# Patient Record
Sex: MALE | Race: WHITE | NOT HISPANIC OR LATINO | Employment: FULL TIME | URBAN - NONMETROPOLITAN AREA
[De-identification: names, ages, dates, MRNs, and addresses within clinical notes are randomized per-mention and may not be internally consistent; named-entity substitution may affect disease eponyms.]

---

## 2019-07-15 ENCOUNTER — OFFICE VISIT (OUTPATIENT)
Dept: URGENT CARE | Facility: PHYSICIAN GROUP | Age: 54
End: 2019-07-15
Payer: COMMERCIAL

## 2019-07-15 VITALS
OXYGEN SATURATION: 95 % | RESPIRATION RATE: 14 BRPM | HEART RATE: 76 BPM | HEIGHT: 69 IN | BODY MASS INDEX: 36.29 KG/M2 | SYSTOLIC BLOOD PRESSURE: 126 MMHG | WEIGHT: 245 LBS | DIASTOLIC BLOOD PRESSURE: 82 MMHG | TEMPERATURE: 97.6 F

## 2019-07-15 DIAGNOSIS — E66.9 OBESITY (BMI 35.0-39.9 WITHOUT COMORBIDITY): ICD-10-CM

## 2019-07-15 DIAGNOSIS — H10.11 ALLERGIC CONJUNCTIVITIS OF RIGHT EYE: ICD-10-CM

## 2019-07-15 PROCEDURE — 99204 OFFICE O/P NEW MOD 45 MIN: CPT | Performed by: PHYSICIAN ASSISTANT

## 2019-07-15 RX ORDER — METOPROLOL SUCCINATE 25 MG/1
25 TABLET, EXTENDED RELEASE ORAL DAILY
COMMUNITY

## 2019-07-15 RX ORDER — CROMOLYN SODIUM 40 MG/ML
2 SOLUTION/ DROPS OPHTHALMIC
Qty: 10 ML | Refills: 0 | Status: SHIPPED | OUTPATIENT
Start: 2019-07-15

## 2019-07-15 RX ORDER — LISINOPRIL AND HYDROCHLOROTHIAZIDE 12.5; 1 MG/1; MG/1
1 TABLET ORAL DAILY
COMMUNITY

## 2019-07-15 RX ORDER — ATORVASTATIN CALCIUM 10 MG/1
10 TABLET, FILM COATED ORAL NIGHTLY
COMMUNITY

## 2019-07-15 RX ORDER — CHLORAL HYDRATE 500 MG
1000 CAPSULE ORAL
COMMUNITY

## 2019-07-15 RX ORDER — GLIPIZIDE 5 MG/1
5 TABLET ORAL 2 TIMES DAILY
COMMUNITY

## 2019-07-15 NOTE — PROGRESS NOTES
Chief Complaint   Patient presents with   • Conjunctivitis       HISTORY OF PRESENT ILLNESS: Patient is a 53 y.o. male who presents today because he has a 2-day history of right eye redness and itching.  He has minimal drainage and no visual disturbances or changes.  He has not been taking any medications for symptoms.    Patient Active Problem List    Diagnosis Date Noted   • Obesity (BMI 35.0-39.9 without comorbidity) ([facility]) 07/15/2019       Allergies:Patient has no known allergies.    Current Outpatient Prescriptions Ordered in [facility]   Medication Sig Dispense Refill   • metformin (GLUCOPHAGE) 1000 MG tablet Take 1,000 mg by mouth 2 times a day, with meals.     • glipiZIDE (GLUCOTROL) 5 MG Tab Take 5 mg by mouth 2 times a day.     • metoprolol SR (TOPROL XL) 25 MG TABLET SR 24 HR Take 25 mg by mouth every day.     • atorvastatin (LIPITOR) 10 MG Tab Take 10 mg by mouth every evening.     • lisinopril-hydrochlorothiazide (PRINZIDE, ZESTORETIC) 10-12.5 MG per tablet Take 1 Tab by mouth every day.     • aspirin EC (ECOTRIN) 81 MG Tablet Delayed Response Take 81 mg by mouth every day.     • Omega-3 Fatty Acids (FISH OIL) 1000 MG Cap capsule Take 1,000 mg by mouth 3 times a day, with meals.     • cromolyn (OPTICROM) 4 % ophthalmic solution Place 2 Drops in right eye 5 Times a Day. 10 mL 0     No current Epic-ordered facility-administered medications on file.        No past medical history on file.    Social History   Substance Use Topics   • Smoking status: Never Smoker   • Smokeless tobacco: Never Used   • Alcohol use Not on file       No family status information on file.   No family history on file.    ROS:  Review of Systems   Constitutional: Negative for fever, chills, weight loss and malaise/fatigue.   HENT: Negative for ear pain, nosebleeds, congestion, sore throat and neck pain.    Eyes: Negative for blurred vision.  Positive for right eye complaints as in HPI  Respiratory: Negative for cough, sputum production,  "shortness of breath and wheezing.    Cardiovascular: Negative for chest pain, palpitations, orthopnea and leg swelling.   Gastrointestinal: Negative for heartburn, nausea, vomiting and abdominal pain.   Genitourinary: Negative for dysuria, urgency and frequency.     Exam:  /82 (BP Location: Right arm, Patient Position: Sitting, BP Cuff Size: Large adult)   Pulse 76   Temp 36.4 °C (97.6 °F) (Temporal)   Resp 14   Ht 1.753 m (5' 9\")   Wt 111.1 kg (245 lb)   SpO2 95%   General:  Well nourished, well developed male in NAD  Head:Normocephalic, atraumatic  Eyes: PERRLA, EOM within normal limits, mild to moderate right conjunctival injection, no scleral icterus, visual fields and acuity grossly intact.  Extremities: no clubbing, cyanosis, or edema.    Please note that this dictation was created using voice recognition software. I have made every reasonable attempt to correct obvious errors, but I expect that there are errors of grammar and possibly content that I did not discover before finalizing the note.    Assessment/Plan:  1. Allergic conjunctivitis of right eye  cromolyn (OPTICROM) 4 % ophthalmic solution   2. Obesity (BMI 35.0-39.9 without comorbidity)  Patient identified as having weight management issue.  Appropriate orders and counseling given.       Followup with primary care in the next 7-10 days if not significantly improving, return to the urgent care or go to the emergency room sooner for any worsening of symptoms.       "

## 2021-09-15 ENCOUNTER — OFFICE VISIT (OUTPATIENT)
Dept: ORTHOPEDIC SURGERY | Facility: CLINIC | Age: 56
End: 2021-09-15

## 2021-09-15 VITALS
SYSTOLIC BLOOD PRESSURE: 162 MMHG | HEIGHT: 69 IN | DIASTOLIC BLOOD PRESSURE: 98 MMHG | HEART RATE: 70 BPM | WEIGHT: 238 LBS | BODY MASS INDEX: 35.25 KG/M2

## 2021-09-15 DIAGNOSIS — M75.81 RIGHT ROTATOR CUFF TENDINITIS: ICD-10-CM

## 2021-09-15 DIAGNOSIS — M25.511 RIGHT SHOULDER PAIN, UNSPECIFIED CHRONICITY: Primary | ICD-10-CM

## 2021-09-15 PROCEDURE — 20610 DRAIN/INJ JOINT/BURSA W/O US: CPT | Performed by: PHYSICIAN ASSISTANT

## 2021-09-15 PROCEDURE — 99203 OFFICE O/P NEW LOW 30 MIN: CPT | Performed by: PHYSICIAN ASSISTANT

## 2021-09-15 RX ORDER — LIDOCAINE HYDROCHLORIDE 10 MG/ML
4 INJECTION, SOLUTION EPIDURAL; INFILTRATION; INTRACAUDAL; PERINEURAL
Status: COMPLETED | OUTPATIENT
Start: 2021-09-15 | End: 2021-09-15

## 2021-09-15 RX ORDER — TRIAMCINOLONE ACETONIDE 40 MG/ML
40 INJECTION, SUSPENSION INTRA-ARTICULAR; INTRAMUSCULAR
Status: COMPLETED | OUTPATIENT
Start: 2021-09-15 | End: 2021-09-15

## 2021-09-15 RX ORDER — CHLORAL HYDRATE 500 MG
1000 CAPSULE ORAL
COMMUNITY

## 2021-09-15 RX ORDER — ASPIRIN 81 MG/1
81 TABLET ORAL DAILY
COMMUNITY

## 2021-09-15 RX ORDER — GLIPIZIDE 2.5 MG/1
TABLET, EXTENDED RELEASE ORAL
COMMUNITY
Start: 2021-08-17

## 2021-09-15 RX ADMIN — LIDOCAINE HYDROCHLORIDE 4 ML: 10 INJECTION, SOLUTION EPIDURAL; INFILTRATION; INTRACAUDAL; PERINEURAL at 10:00

## 2021-09-15 RX ADMIN — TRIAMCINOLONE ACETONIDE 40 MG: 40 INJECTION, SUSPENSION INTRA-ARTICULAR; INTRAMUSCULAR at 10:00

## 2021-09-15 NOTE — PROGRESS NOTES
Procedure   Large Joint Arthrocentesis: R subacromial bursa  Date/Time: 9/15/2021 10:00 AM  Consent given by: patient  Site marked: site marked  Timeout: Immediately prior to procedure a time out was called to verify the correct patient, procedure, equipment, support staff and site/side marked as required   Supporting Documentation  Indications: pain   Procedure Details  Location: shoulder - R subacromial bursa  Preparation: Patient was prepped and draped in the usual sterile fashion  Needle size: 23 G  Approach: posterior  Medications administered: 4 mL lidocaine PF 1% 1 %; 40 mg triamcinolone acetonide 40 MG/ML  Patient tolerance: patient tolerated the procedure well with no immediate complications

## 2021-09-15 NOTE — PROGRESS NOTES
[facility] Note        Subjective     Pain of the Right Shoulder      HPI    Julian Rodriguez is a 55 y.o. male.  This is a very pleasant right-hand-dominant male presenting to discuss his right shoulder pain.  He reports is been bothering him for about 2 months.  No specific trauma or injury but has been moving and packing and using his arm a great deal.  He complains of aids anterior shoulder pain 8/10 with overhead activity and lifting.  It is stabbing and aching.  He does have some radiating pain.  Treated with Tylenol.  He is status post cuff repair in 2013.  Here for further evaluation treatment recommendations    Past Medical History:   Diagnosis Date   • Prostate cancer (CMS/HCC)    • Umbilical hernia       Past Surgical History:   Procedure Laterality Date   • BLADDER SLING MODIFIED, ANTERIOR AND POSTERIOR VAGINAL REPAIR     • PENILE PROSTHESIS IMPLANT     • PROSTATECTOMY     • ROTATOR CUFF REPAIR        No family history on file.  Social History     Socioeconomic History   • Marital status: Unknown     Spouse name: Not on file   • Number of children: Not on file   • Years of education: Not on file   • Highest education level: Not on file   Tobacco Use   • Smoking status: Never Smoker   • Smokeless tobacco: Never Used   Substance and Sexual Activity   • Alcohol use: Yes     Alcohol/week: 6.0 standard drinks     Types: 6 Glasses of wine per week   • Drug use: Never      Current Outpatient Medications on File Prior to Visit   Medication Sig Dispense Refill   • aspirin (aspirin) 81 MG EC tablet Take 81 mg by mouth Daily.     • atorvastatin (LIPITOR) 10 MG tablet Take 10 mg by mouth Daily.     • escitalopram (LEXAPRO) 20 MG tablet Take 20 mg by mouth Daily.     • glipizide (GLUCOTROL XL) 2.5 MG 24 hr tablet      • lisinopril-hydrochlorothiazide (PRINZIDE,ZESTORETIC) 10-12.5 MG per tablet Take 1 tablet by mouth Daily.     • metFORMIN (GLUCOPHAGE) 1000 MG tablet Take 1,000 mg by mouth 2  "(Two) Times a Day.     • Omega-3 Fatty Acids (fish oil) 1000 MG capsule capsule Take 1,000 mg by mouth.     • [DISCONTINUED] glipizide (GLUCOTROL) 5 MG tablet Take 5 mg by mouth 2 (Two) Times a Day.       No current facility-administered medications on file prior to visit.      No Known Allergies       Review of Systems   Constitutional: Negative.    HENT: Negative.    Eyes: Negative.    Respiratory: Negative.    Cardiovascular: Negative.    Gastrointestinal: Negative.    Endocrine: Negative.    Genitourinary: Negative.    Musculoskeletal: Positive for arthralgias.   Skin: Negative.    Allergic/Immunologic: Negative.    Neurological: Negative.    Hematological: Negative.    Psychiatric/Behavioral: Negative.         I reviewed the patient's chief complaint, history of present illness, review of systems, past medical history, surgical history, family history, social history, medications and allergy list.        Objective      Physical Exam  /98   Pulse 70   Ht 175.3 cm (69.02\")   Wt 108 kg (238 lb)   BMI 35.13 kg/m²     Body mass index is 35.13 kg/m².    General  Mental Status - alert  General Appearance - cooperative, well groomed, not in acute distress  Orientation - Oriented X3  Build & Nutrition - well developed and well nourished  Posture - normal posture  Gait -normal       Ortho Exam  Musculoskeletal   Upper Extremity   Right Shoulder     Inspection and Palpation:     Tenderness -tender over the supraspinatus no cervical or scapular tenderness    Crepitus - none    Sensation is normal    Examination reveals no ecchymosis.        Strength and Tone:    Supraspinatus -5/5    External Rotators-5/5    Infraspinatus - 5/5    Subscapularis -5/5    Deltoid - 5/5     Range of Motion   Left shoulder:    Internal Rotation: ROM - T7    External Rotation: AROM - 80 degrees    Elevation through flexion: AROM - 180 degrees    Right Shoulder:    Internal Rotation: ROM -T7    External Rotation: AROM -80 " degrees    Elevation through flexion: AROM -180 degrees         Impingement   Right shoulder    Hernandez-Bruce impingement test positive    Neer impingement test positive     Functional Testing   Right shoulder    AC crossover adduction test negative      Imaging/Studies  Imaging Results (Last 24 Hours)     Procedure Component Value Units Date/Time    XR Shoulder 2+ View Right [[id number]] Resulted: 09/15/21 0942     Updated: 09/15/21 0943    Narrative:      Right Shoulder Radiographs  Indication: right shoulder pain  Views: AP, outlet and axillary views of the right shoulder    Comparison: no prior studies available for review    Findings:  No acute bony abnormalities.  Small calcification seen just superior to   the glenoid on the AP view, with no other unusual bony features.              Assessment    Assessment:  1. Right shoulder pain, unspecified chronicity    2. Right rotator cuff tendinitis          Plan:  1. Recommend over-the-counter medication as needed for discomfort  2. Right rotator cuff tendinitis.  Reviewed today's x-rays clinical findings past and current treatment the patient.  We discussed further treatment including plus or minus subacromial steroid injection and formal physical therapy.  Patient would like to avoid formal PT at this time as he has a lot of travel of head of him to.  I given him a sheet of exercises to do at home.  I will see him back in 2 months or sooner if needed.    I discussed with the patient the potential benefits of performing a therapeutic injection of the right shoulder as well as potential risks including but not limited to infection, swelling, pain, bleeding, bruising, nerve/vessel damage, skin color changes, transient elevation in blood glucose levels, and fat atrophy. After informed consent and verifying correct patient, procedure site, and type of procedure, the area was prepped with Hibiclens, ethyl chloride was used to numb the skin. Via the posterior approach,  4cc of 1% lidocaine and  40mg/ml of Kenalog were injected into the right subacromial space. The patient tolerated the procedure well. There were no complications.           Gaby Salazar PA-C  09/15/21  14:18 EDT

## 2021-09-27 ENCOUNTER — TRANSCRIBE ORDERS (OUTPATIENT)
Dept: ADMINISTRATIVE | Facility: HOSPITAL | Age: 56
End: 2021-09-27

## 2021-09-27 DIAGNOSIS — C61 ADENOCARCINOMA OF PROSTATE (HCC): Primary | ICD-10-CM

## 2021-09-29 ENCOUNTER — HOSPITAL ENCOUNTER (OUTPATIENT)
Dept: NUCLEAR MEDICINE | Facility: HOSPITAL | Age: 56
Discharge: HOME | End: 2021-09-29

## 2021-09-29 ENCOUNTER — HOSPITAL ENCOUNTER (OUTPATIENT)
Dept: CT IMAGING | Facility: HOSPITAL | Age: 56
Discharge: HOME | End: 2021-09-29
Admitting: NURSE PRACTITIONER

## 2021-09-29 DIAGNOSIS — C61 ADENOCARCINOMA OF PROSTATE (HCC): ICD-10-CM

## 2021-09-29 PROCEDURE — 0 TECHNETIUM MEDRONATE KIT: Performed by: NURSE PRACTITIONER

## 2021-09-29 PROCEDURE — 82565 ASSAY OF CREATININE: CPT

## 2021-09-29 PROCEDURE — A9503 TC99M MEDRONATE: HCPCS | Performed by: NURSE PRACTITIONER

## 2021-09-29 PROCEDURE — 25010000002 IOPAMIDOL 61 % SOLUTION: Performed by: NURSE PRACTITIONER

## 2021-09-29 PROCEDURE — 78306 BONE IMAGING WHOLE BODY: CPT

## 2021-09-29 PROCEDURE — 74177 CT ABD & PELVIS W/CONTRAST: CPT

## 2021-09-29 RX ORDER — TC 99M MEDRONATE 20 MG/10ML
26.1 INJECTION, POWDER, LYOPHILIZED, FOR SOLUTION INTRAVENOUS
Status: COMPLETED | OUTPATIENT
Start: 2021-09-29 | End: 2021-09-29

## 2021-09-29 RX ADMIN — IOPAMIDOL 95 ML: 612 INJECTION, SOLUTION INTRAVENOUS at 11:57

## 2021-09-29 RX ADMIN — Medication 26.1 MILLICURIE: at 11:20

## 2021-10-04 LAB — CREAT BLDA-MCNC: 0.9 MG/DL (ref 0.6–1.3)

## 2021-10-13 ENCOUNTER — TELEPHONE (OUTPATIENT)
Dept: ORTHOPEDIC SURGERY | Facility: CLINIC | Age: 56
End: 2021-10-13

## 2021-10-13 NOTE — TELEPHONE ENCOUNTER
Left message for patient to contact me to reschedule his appointment on 11/24/21 since Gaby will be out.  Send patient directly to me or reschedule him for another day.

## 2021-11-30 ENCOUNTER — OFFICE VISIT (OUTPATIENT)
Dept: ORTHOPEDIC SURGERY | Facility: CLINIC | Age: 56
End: 2021-11-30

## 2021-11-30 VITALS — HEIGHT: 69 IN | WEIGHT: 238 LBS | BODY MASS INDEX: 35.25 KG/M2

## 2021-11-30 DIAGNOSIS — M75.81 RIGHT ROTATOR CUFF TENDINITIS: Primary | ICD-10-CM

## 2021-11-30 DIAGNOSIS — M25.511 RIGHT SHOULDER PAIN, UNSPECIFIED CHRONICITY: ICD-10-CM

## 2021-11-30 DIAGNOSIS — M75.21 BICEPS TENDONITIS ON RIGHT: ICD-10-CM

## 2021-11-30 PROCEDURE — 99213 OFFICE O/P EST LOW 20 MIN: CPT | Performed by: PHYSICIAN ASSISTANT

## 2021-11-30 PROCEDURE — 20610 DRAIN/INJ JOINT/BURSA W/O US: CPT | Performed by: PHYSICIAN ASSISTANT

## 2021-11-30 RX ADMIN — TRIAMCINOLONE ACETONIDE 40 MG: 40 INJECTION, SUSPENSION INTRA-ARTICULAR; INTRAMUSCULAR at 08:53

## 2021-11-30 RX ADMIN — LIDOCAINE HYDROCHLORIDE 1 ML: 10 INJECTION, SOLUTION INFILTRATION; PERINEURAL at 08:53

## 2021-12-01 RX ORDER — LIDOCAINE HYDROCHLORIDE 10 MG/ML
1 INJECTION, SOLUTION INFILTRATION; PERINEURAL
Status: COMPLETED | OUTPATIENT
Start: 2021-11-30 | End: 2021-11-30

## 2021-12-01 RX ORDER — TRIAMCINOLONE ACETONIDE 40 MG/ML
40 INJECTION, SUSPENSION INTRA-ARTICULAR; INTRAMUSCULAR
Status: COMPLETED | OUTPATIENT
Start: 2021-11-30 | End: 2021-11-30

## 2021-12-03 ENCOUNTER — TELEPHONE (OUTPATIENT)
Dept: ORTHOPEDIC SURGERY | Facility: CLINIC | Age: 56
End: 2021-12-03

## 2021-12-03 RX ORDER — MELOXICAM 15 MG/1
TABLET ORAL
Qty: 60 TABLET | Refills: 1 | Status: SHIPPED | OUTPATIENT
Start: 2021-12-03 | End: 2022-02-25

## 2021-12-03 NOTE — TELEPHONE ENCOUNTER
Gaby did not state a specific anti inflammatory in her note, so one was not pended.      Lucia Salazar

## 2021-12-03 NOTE — TELEPHONE ENCOUNTER
Patient called and states his pharmacy did not receive his anti inflammatory medication was send in for him on  Tuesday. Patient is going out of town tomorrow and needs the medications. Patient uses [facility] in [place]

## 2022-01-19 ENCOUNTER — TREATMENT (OUTPATIENT)
Dept: PHYSICAL THERAPY | Facility: CLINIC | Age: 57
End: 2022-01-19

## 2022-01-19 DIAGNOSIS — G89.29 CHRONIC RIGHT SHOULDER PAIN: Primary | ICD-10-CM

## 2022-01-19 DIAGNOSIS — M25.511 CHRONIC RIGHT SHOULDER PAIN: Primary | ICD-10-CM

## 2022-01-19 PROCEDURE — 97110 THERAPEUTIC EXERCISES: CPT | Performed by: PHYSICAL THERAPIST

## 2022-01-19 PROCEDURE — 97161 PT EVAL LOW COMPLEX 20 MIN: CPT | Performed by: PHYSICAL THERAPIST

## 2022-01-19 NOTE — PROGRESS NOTES
Physical Therapy Initial Evaluation and Plan of Care      Patient: Julian Rodriguez   : 1965  Diagnosis/ICD-10 Code:  The encounter diagnosis was Chronic right shoulder pain.   Referring practitioner: ANIL Henry*  Date of Initial Visit: Type: THERAPY  Noted: 2022  Today's Date: 2022  Patient seen for 1 sessions         Visit Diagnoses:    ICD-10-CM ICD-9-CM   1. Chronic right shoulder pain  M25.511 719.41    G89.29 338.29       Subjective Questionnaire: QuickDASH: 11.36    Subjective Evaluation    History of Present Illness  Onset date: 2021.  Mechanism of injury: Pt moved to new home last summer.  Noticed shoulder irritation after that.  He reports being very active around the house with building and organization projects.  He has stage 4 metastatic prostate cancer.  He is being treated through [facility].  He will be having radiation treatment in the near future.    He reports temporary relief after injection and after taking anti-inflammatories.  However, symptoms return as he resumes using the arm.  His PA gave him exercises to do, but he says they have not helped his symptoms.    Subjective comment: R shoulder pain  Patient Occupation: Retired Pain  Current pain ratin  At best pain ratin  At worst pain ratin  Location: R anterior shoulder  Quality: dull ache  Relieving factors: rest  Aggravating factors: overhead activity, repetitive movement and lifting  Progression: worsening    Hand dominance: right    Diagnostic Tests  Abnormal bone scan: normal shoulder but possible evidence of bony metastasis in ribs.    Treatments  Previous treatment: injection treatment and medication  Patient Goals  Patient goals for therapy: decreased pain           Treatment            Functional Testing  Functional Tests Options: Quick DASH   Objective          Postural Observations  Seated posture: poor  Standing posture: fair    Additional Postural Observation Details  Protracted B  scapulae and increased thoracic kyphosis.    Tenderness     Right Shoulder  Tenderness in the bicipital groove.     Cervical/Thoracic Screen   Thoracic range of motion within normal limits with the following exceptions: Pt is able to actively extend thoracic spine, but tends to rest in kyphotic posture.    Neurological Testing     Sensation     Shoulder   Left Shoulder   Intact: light touch    Right Shoulder   Intact: light touch    Active Range of Motion   Left Shoulder   Normal active range of motion    Right Shoulder   Normal active range of motion    Scapular Mobility     Additional Scapular Mobility Details  Decreased scapular retraction and depression.    Joint Play     Right Shoulder  Hypomobile in the inferior capsule and AC joint.     Strength/Myotome Testing     Left Shoulder   Normal muscle strength    Right Shoulder   Normal muscle strength    Tests     Right Shoulder   Negative Speed's.     Additional Tests Details  Normal strength.  Discomfort with resisted ER and with eccentric lowering from flexion.     General Comments     Shoulder Comments   Decreased pectoral flexibility.         Assessment & Plan     Assessment  Impairments: activity intolerance, lacks appropriate home exercise program and pain with function  Functional Limitations: lifting, uncomfortable because of pain, reaching overhead and unable to perform repetitive tasks  Assessment details: Pt continues with usual activities regardless of pain provocation.  Prognosis: fair  Prognosis details: Pt may not be receptive to modifying activity to allow healing of affected area.    Goals  Plan Goals: 4 weeks  Pt. demonstrates independence and compliance with initial HEP.  Pt. reports reduction in pain intensity to no worse than 3/10 on NPRS.  Pt demonstrates improving postural awareness to reduce anterior shoulder strain.    8 weeks  Pt. demonstrates independence in advanced HEP for ongoing improvement.  Pt notices improving tolerance for  activity with balance of rest, stretching, postural correction, and activity modification.  Functional outcome measure is improved to <10 points.          Plan  Therapy options: will be seen for skilled therapy services  Planned modality interventions: cryotherapy and iontophoresis  Planned therapy interventions: manual therapy, postural training, stretching, therapeutic activities, joint mobilization, home exercise program, functional ROM exercises, flexibility and body mechanics training  Frequency: 1x week  Duration in weeks: 8  Treatment plan discussed with: patient  Plan details: PT weekly for up to 8 weeks, addressing pain, posture, and functional limitations related to shoulder symptoms.        Timed:  Manual Therapy:         mins  50610;  Therapeutic Exercise:    10     mins  01579;     Neuromuscular Amanda:        mins  90094;    Therapeutic Activity:          mins  47899;     Gait Training:           mins  68403;     Ultrasound:          mins  01854;    Electrical Stimulation:         mins  11797 ( );  Iontophoresis  ___ mins   12528    Untimed:  Electrical Stimulation:         mins  57253 ( );  Mechanical Traction:         mins  68936;     Timed Treatment:   10   mins   Total Treatment:     40   mins    PT SIGNATURE: Isis Valadez PT   Electronically signed  DATE TREATMENT INITIATED: 1/19/2022    Initial Certification  Certification Period: 1/19/2022thru4/18/2022  I certify that the therapy services are furnished while this patient is under my care.  The services outlined above are required by this patient, and will be reviewed every 90 days.    Physician Signature:_____________________________________________     PHYSICIAN: Gaby Salazar PA-C      DATE:     Please sign and return via fax to [phone].. Thank you, [facility].

## 2022-02-10 ENCOUNTER — TREATMENT (OUTPATIENT)
Dept: PHYSICAL THERAPY | Facility: CLINIC | Age: 57
End: 2022-02-10

## 2022-02-10 DIAGNOSIS — G89.29 CHRONIC RIGHT SHOULDER PAIN: Primary | ICD-10-CM

## 2022-02-10 DIAGNOSIS — M25.511 CHRONIC RIGHT SHOULDER PAIN: Primary | ICD-10-CM

## 2022-02-10 PROCEDURE — 97110 THERAPEUTIC EXERCISES: CPT | Performed by: PHYSICAL THERAPIST

## 2022-02-10 NOTE — PROGRESS NOTES
Physical Therapy Daily Progress Note  VISIT: 2          Subjective    Julian Keeping reports: intermittent R shoulder pain with activities such as lifting groceries.  He demonstrates a motion with outstretched arm, as if reaching over the car seat.  He says none of the exercises hurt as he does them, but overall, they seem to make his shoulder feel worse.  He is not interested in modifying the exercises.  He has just stopped doing them.        Pre-treatment pain:  0  Post-treatment pain:  0      Objective    Treatment    Exercise 1  Exercise Name 1: Pt education in lifting mechanics to reduce strain on R shoulder                 Assessment & Plan     Assessment    Assessment details: Pt does not think he wants to continue with PT.  He will consult with ortho PA and will cancel his last PT session if she is in agreement.  He is dealing with stage 4 cancer and wishes to emphasize quality of life rather than going to more medical appointments.      Plan  Plan details: Pt may cancel last PT follow up after consulting with ortho PA.                 Timed:  Manual Therapy:         mins  50641;  Therapeutic Exercise:    15     mins  01432;     Neuromuscular Amanda:        mins  70897;    Therapeutic Activity:          mins  19146;     Gait Training:           mins  22988;     Ultrasound:          mins  55937;    Electrical Stimulation:         mins  77629 ( );    Untimed:  Electrical Stimulation:         mins  01963 ( );  Mechanical Traction:         mins  50422;     Timed Treatment:   15   mins   Total Treatment:     15   mins      Isis Valadez PT  Physical Therapist

## 2022-02-25 ENCOUNTER — OFFICE VISIT (OUTPATIENT)
Dept: ORTHOPEDIC SURGERY | Facility: CLINIC | Age: 57
End: 2022-02-25

## 2022-02-25 VITALS
DIASTOLIC BLOOD PRESSURE: 86 MMHG | WEIGHT: 235.4 LBS | HEIGHT: 69 IN | SYSTOLIC BLOOD PRESSURE: 138 MMHG | BODY MASS INDEX: 34.87 KG/M2

## 2022-02-25 DIAGNOSIS — M25.511 RIGHT SHOULDER PAIN, UNSPECIFIED CHRONICITY: ICD-10-CM

## 2022-02-25 DIAGNOSIS — M75.81 RIGHT ROTATOR CUFF TENDINITIS: ICD-10-CM

## 2022-02-25 DIAGNOSIS — M75.21 BICEPS TENDONITIS ON RIGHT: Primary | ICD-10-CM

## 2022-02-25 PROCEDURE — 99213 OFFICE O/P EST LOW 20 MIN: CPT | Performed by: PHYSICIAN ASSISTANT

## 2022-02-25 PROCEDURE — 20550 NJX 1 TENDON SHEATH/LIGAMENT: CPT | Performed by: PHYSICIAN ASSISTANT

## 2022-02-25 RX ORDER — MELOXICAM 15 MG/1
TABLET ORAL
Qty: 60 TABLET | Refills: 1 | Status: SHIPPED | OUTPATIENT
Start: 2022-02-25 | End: 2022-12-19 | Stop reason: HOSPADM

## 2022-02-25 RX ADMIN — TRIAMCINOLONE ACETONIDE 40 MG: 40 INJECTION, SUSPENSION INTRA-ARTICULAR; INTRAMUSCULAR at 09:05

## 2022-02-25 RX ADMIN — LIDOCAINE HYDROCHLORIDE 5 ML: 10 INJECTION, SOLUTION EPIDURAL; INFILTRATION; INTRACAUDAL; PERINEURAL at 09:05

## 2022-03-01 RX ORDER — LIDOCAINE HYDROCHLORIDE 10 MG/ML
5 INJECTION, SOLUTION EPIDURAL; INFILTRATION; INTRACAUDAL; PERINEURAL
Status: COMPLETED | OUTPATIENT
Start: 2022-02-25 | End: 2022-02-25

## 2022-03-01 RX ORDER — TRIAMCINOLONE ACETONIDE 40 MG/ML
40 INJECTION, SUSPENSION INTRA-ARTICULAR; INTRAMUSCULAR
Status: COMPLETED | OUTPATIENT
Start: 2022-02-25 | End: 2022-02-25

## 2022-03-28 ENCOUNTER — DOCUMENTATION (OUTPATIENT)
Dept: PHYSICAL THERAPY | Facility: CLINIC | Age: 57
End: 2022-03-28

## 2022-03-28 NOTE — PROGRESS NOTES
Discharge Summary  Discharge Summary from Physical Therapy Report      Patient: Julian Rodriguez   : 1965  Diagnosis/ICD-10 Code:  There were no encounter diagnoses.   Referring practitioner: No ref. provider found  Date of Initial Visit: No linked episodes  Today's Date: 3/28/2022  Date of Last Visit: 2-     Number of Visits: 2    Discharge Status of Patient: cancelled last 3 scheduled appointments    Goals: unknown    Discharge Plan: Continue with current home exercise program as instructed      Date of Discharge: 3/28/2022        Isis Valadez, PT

## 2022-04-07 ENCOUNTER — HOSPITAL ENCOUNTER (OUTPATIENT)
Dept: MRI IMAGING | Facility: HOSPITAL | Age: 57
Discharge: HOME | End: 2022-04-07

## 2022-04-07 DIAGNOSIS — M25.511 RIGHT SHOULDER PAIN, UNSPECIFIED CHRONICITY: ICD-10-CM

## 2022-04-07 DIAGNOSIS — M75.21 BICEPS TENDONITIS ON RIGHT: ICD-10-CM

## 2022-04-07 DIAGNOSIS — M75.81 RIGHT ROTATOR CUFF TENDINITIS: ICD-10-CM

## 2022-12-17 ENCOUNTER — HOSPITAL ENCOUNTER (OUTPATIENT)
Facility: HOSPITAL | Age: 57
Discharge: HOME | End: 2022-12-19
Attending: EMERGENCY MEDICINE | Admitting: INTERNAL MEDICINE

## 2022-12-17 ENCOUNTER — APPOINTMENT (OUTPATIENT)
Dept: GENERAL RADIOLOGY | Facility: HOSPITAL | Age: 57
End: 2022-12-17

## 2022-12-17 DIAGNOSIS — Z85.46 HISTORY OF PROSTATE CANCER: ICD-10-CM

## 2022-12-17 DIAGNOSIS — J10.1 INFLUENZA A: Primary | ICD-10-CM

## 2022-12-17 DIAGNOSIS — R11.10 INTRACTABLE VOMITING: ICD-10-CM

## 2022-12-17 DIAGNOSIS — E83.42 HYPOMAGNESEMIA: ICD-10-CM

## 2022-12-17 DIAGNOSIS — R11.14 BILIOUS VOMITING WITH NAUSEA: ICD-10-CM

## 2022-12-17 PROBLEM — T45.1X5A ANEMIA DUE TO CHEMOTHERAPY: Status: ACTIVE | Noted: 2022-12-17

## 2022-12-17 PROBLEM — R11.2 NAUSEA AND VOMITING: Status: ACTIVE | Noted: 2022-12-17

## 2022-12-17 PROBLEM — D64.81 ANEMIA DUE TO CHEMOTHERAPY: Status: ACTIVE | Noted: 2022-12-17

## 2022-12-17 PROBLEM — D64.9 CHRONIC ANEMIA: Status: ACTIVE | Noted: 2022-12-17

## 2022-12-17 LAB
ALBUMIN SERPL-MCNC: 3.8 G/DL (ref 3.5–5.2)
ALBUMIN/GLOB SERPL: 1 G/DL
ALP SERPL-CCNC: 106 U/L (ref 39–117)
ALT SERPL W P-5'-P-CCNC: 36 U/L (ref 1–41)
AMYLASE SERPL-CCNC: 48 U/L (ref 28–100)
ANION GAP SERPL CALCULATED.3IONS-SCNC: 8 MMOL/L (ref 5–15)
AST SERPL-CCNC: 32 U/L (ref 1–40)
BACTERIA UR QL AUTO: NORMAL /HPF
BASOPHILS # BLD AUTO: 0.03 10*3/MM3 (ref 0–0.2)
BASOPHILS NFR BLD AUTO: 0.5 % (ref 0–1.5)
BILIRUB SERPL-MCNC: 0.6 MG/DL (ref 0–1.2)
BILIRUB UR QL STRIP: NEGATIVE
BUN SERPL-MCNC: 9 MG/DL (ref 6–20)
BUN/CREAT SERPL: 10.6 (ref 7–25)
CALCIUM SPEC-SCNC: 9.3 MG/DL (ref 8.6–10.5)
CHLORIDE SERPL-SCNC: 99 MMOL/L (ref 98–107)
CLARITY UR: ABNORMAL
CO2 SERPL-SCNC: 26 MMOL/L (ref 22–29)
COLOR UR: YELLOW
CREAT SERPL-MCNC: 0.85 MG/DL (ref 0.76–1.27)
D-LACTATE SERPL-SCNC: 0.8 MMOL/L (ref 0.5–2)
DEPRECATED RDW RBC AUTO: 44.8 FL (ref 37–54)
EGFRCR SERPLBLD CKD-EPI 2021: 100.1 ML/MIN/1.73
EOSINOPHIL # BLD AUTO: 0 10*3/MM3 (ref 0–0.4)
EOSINOPHIL NFR BLD AUTO: 0 % (ref 0.3–6.2)
ERYTHROCYTE [DISTWIDTH] IN BLOOD BY AUTOMATED COUNT: 12.7 % (ref 12.3–15.4)
FLUAV RNA RESP QL NAA+PROBE: DETECTED
FLUBV RNA RESP QL NAA+PROBE: NOT DETECTED
GLOBULIN UR ELPH-MCNC: 4 GM/DL
GLUCOSE BLDC GLUCOMTR-MCNC: 114 MG/DL (ref 70–130)
GLUCOSE SERPL-MCNC: 146 MG/DL (ref 65–99)
GLUCOSE UR STRIP-MCNC: NEGATIVE MG/DL
HBA1C MFR BLD: 6.1 % (ref 4.8–5.6)
HCT VFR BLD AUTO: 30.5 % (ref 37.5–51)
HGB BLD-MCNC: 10.4 G/DL (ref 13–17.7)
HGB UR QL STRIP.AUTO: NEGATIVE
HOLD SPECIMEN: NORMAL
HYALINE CASTS UR QL AUTO: NORMAL /LPF
IMM GRANULOCYTES # BLD AUTO: 0.01 10*3/MM3 (ref 0–0.05)
IMM GRANULOCYTES NFR BLD AUTO: 0.2 % (ref 0–0.5)
KETONES UR QL STRIP: NEGATIVE
LEUKOCYTE ESTERASE UR QL STRIP.AUTO: NEGATIVE
LIPASE SERPL-CCNC: 36 U/L (ref 13–60)
LYMPHOCYTES # BLD AUTO: 1.61 10*3/MM3 (ref 0.7–3.1)
LYMPHOCYTES NFR BLD AUTO: 27.3 % (ref 19.6–45.3)
MAGNESIUM SERPL-MCNC: 1.4 MG/DL (ref 1.6–2.6)
MCH RBC QN AUTO: 32.8 PG (ref 26.6–33)
MCHC RBC AUTO-ENTMCNC: 34.1 G/DL (ref 31.5–35.7)
MCV RBC AUTO: 96.2 FL (ref 79–97)
MONOCYTES # BLD AUTO: 0.65 10*3/MM3 (ref 0.1–0.9)
MONOCYTES NFR BLD AUTO: 11 % (ref 5–12)
NEUTROPHILS NFR BLD AUTO: 3.6 10*3/MM3 (ref 1.7–7)
NEUTROPHILS NFR BLD AUTO: 61 % (ref 42.7–76)
NITRITE UR QL STRIP: NEGATIVE
NRBC BLD AUTO-RTO: 0 /100 WBC (ref 0–0.2)
PH UR STRIP.AUTO: 7.5 [PH] (ref 5–8)
PLATELET # BLD AUTO: 150 10*3/MM3 (ref 140–450)
PMV BLD AUTO: 9.8 FL (ref 6–12)
POTASSIUM SERPL-SCNC: 4 MMOL/L (ref 3.5–5.2)
PROT SERPL-MCNC: 7.8 G/DL (ref 6–8.5)
PROT UR QL STRIP: ABNORMAL
RBC # BLD AUTO: 3.17 10*6/MM3 (ref 4.14–5.8)
RBC # UR STRIP: NORMAL /HPF
REF LAB TEST METHOD: NORMAL
SARS-COV-2 RNA RESP QL NAA+PROBE: NOT DETECTED
SODIUM SERPL-SCNC: 133 MMOL/L (ref 136–145)
SP GR UR STRIP: 1.01 (ref 1–1.03)
SQUAMOUS #/AREA URNS HPF: NORMAL /HPF
TROPONIN T SERPL-MCNC: <0.01 NG/ML (ref 0–0.03)
UROBILINOGEN UR QL STRIP: ABNORMAL
WBC # UR STRIP: NORMAL /HPF
WBC NRBC COR # BLD: 5.9 10*3/MM3 (ref 3.4–10.8)
WHOLE BLOOD HOLD COAG: NORMAL
WHOLE BLOOD HOLD SPECIMEN: NORMAL

## 2022-12-17 PROCEDURE — 87636 SARSCOV2 & INF A&B AMP PRB: CPT | Performed by: EMERGENCY MEDICINE

## 2022-12-17 PROCEDURE — G0378 HOSPITAL OBSERVATION PER HR: HCPCS

## 2022-12-17 PROCEDURE — 25010000002 MAGNESIUM SULFATE 2 GM/50ML SOLUTION: Performed by: NURSE PRACTITIONER

## 2022-12-17 PROCEDURE — 85025 COMPLETE CBC W/AUTO DIFF WBC: CPT | Performed by: EMERGENCY MEDICINE

## 2022-12-17 PROCEDURE — 82962 GLUCOSE BLOOD TEST: CPT

## 2022-12-17 PROCEDURE — 83036 HEMOGLOBIN GLYCOSYLATED A1C: CPT | Performed by: HOSPITALIST

## 2022-12-17 PROCEDURE — 96366 THER/PROPH/DIAG IV INF ADDON: CPT

## 2022-12-17 PROCEDURE — 96365 THER/PROPH/DIAG IV INF INIT: CPT

## 2022-12-17 PROCEDURE — 83690 ASSAY OF LIPASE: CPT | Performed by: NURSE PRACTITIONER

## 2022-12-17 PROCEDURE — 82150 ASSAY OF AMYLASE: CPT | Performed by: NURSE PRACTITIONER

## 2022-12-17 PROCEDURE — 81001 URINALYSIS AUTO W/SCOPE: CPT | Performed by: EMERGENCY MEDICINE

## 2022-12-17 PROCEDURE — C9803 HOPD COVID-19 SPEC COLLECT: HCPCS

## 2022-12-17 PROCEDURE — 83735 ASSAY OF MAGNESIUM: CPT | Performed by: EMERGENCY MEDICINE

## 2022-12-17 PROCEDURE — 93005 ELECTROCARDIOGRAM TRACING: CPT | Performed by: EMERGENCY MEDICINE

## 2022-12-17 PROCEDURE — 83605 ASSAY OF LACTIC ACID: CPT | Performed by: HOSPITALIST

## 2022-12-17 PROCEDURE — 84484 ASSAY OF TROPONIN QUANT: CPT | Performed by: EMERGENCY MEDICINE

## 2022-12-17 PROCEDURE — 71045 X-RAY EXAM CHEST 1 VIEW: CPT

## 2022-12-17 PROCEDURE — 99284 EMERGENCY DEPT VISIT MOD MDM: CPT

## 2022-12-17 PROCEDURE — 36415 COLL VENOUS BLD VENIPUNCTURE: CPT

## 2022-12-17 PROCEDURE — 99220 PR INITIAL OBSERVATION CARE/DAY 70 MINUTES: CPT | Performed by: NURSE PRACTITIONER

## 2022-12-17 PROCEDURE — 80053 COMPREHEN METABOLIC PANEL: CPT | Performed by: EMERGENCY MEDICINE

## 2022-12-17 PROCEDURE — 25010000002 ENOXAPARIN PER 10 MG: Performed by: NURSE PRACTITIONER

## 2022-12-17 PROCEDURE — 96361 HYDRATE IV INFUSION ADD-ON: CPT

## 2022-12-17 RX ORDER — FLUTICASONE PROPIONATE 50 MCG
2 SPRAY, SUSPENSION (ML) NASAL DAILY
Status: DISCONTINUED | OUTPATIENT
Start: 2022-12-17 | End: 2022-12-19 | Stop reason: HOSPADM

## 2022-12-17 RX ORDER — NICOTINE POLACRILEX 4 MG
15 LOZENGE BUCCAL
Status: DISCONTINUED | OUTPATIENT
Start: 2022-12-17 | End: 2022-12-19 | Stop reason: HOSPADM

## 2022-12-17 RX ORDER — ATORVASTATIN CALCIUM 10 MG/1
10 TABLET, FILM COATED ORAL DAILY
Status: DISCONTINUED | OUTPATIENT
Start: 2022-12-18 | End: 2022-12-19 | Stop reason: HOSPADM

## 2022-12-17 RX ORDER — ACETAMINOPHEN 325 MG/1
650 TABLET ORAL EVERY 6 HOURS PRN
Status: DISCONTINUED | OUTPATIENT
Start: 2022-12-17 | End: 2022-12-19 | Stop reason: HOSPADM

## 2022-12-17 RX ORDER — SODIUM CHLORIDE 0.9 % (FLUSH) 0.9 %
10 SYRINGE (ML) INJECTION AS NEEDED
Status: DISCONTINUED | OUTPATIENT
Start: 2022-12-17 | End: 2022-12-19 | Stop reason: HOSPADM

## 2022-12-17 RX ORDER — POTASSIUM CHLORIDE 1.5 G/1.77G
40 POWDER, FOR SOLUTION ORAL AS NEEDED
Status: DISCONTINUED | OUTPATIENT
Start: 2022-12-17 | End: 2022-12-19 | Stop reason: HOSPADM

## 2022-12-17 RX ORDER — SODIUM CHLORIDE 9 MG/ML
40 INJECTION, SOLUTION INTRAVENOUS AS NEEDED
Status: DISCONTINUED | OUTPATIENT
Start: 2022-12-17 | End: 2022-12-19 | Stop reason: HOSPADM

## 2022-12-17 RX ORDER — MAGNESIUM SULFATE HEPTAHYDRATE 40 MG/ML
2 INJECTION, SOLUTION INTRAVENOUS AS NEEDED
Status: DISCONTINUED | OUTPATIENT
Start: 2022-12-17 | End: 2022-12-19 | Stop reason: HOSPADM

## 2022-12-17 RX ORDER — MAGNESIUM SULFATE HEPTAHYDRATE 40 MG/ML
4 INJECTION, SOLUTION INTRAVENOUS AS NEEDED
Status: DISCONTINUED | OUTPATIENT
Start: 2022-12-17 | End: 2022-12-19 | Stop reason: HOSPADM

## 2022-12-17 RX ORDER — POTASSIUM CHLORIDE 7.45 MG/ML
10 INJECTION INTRAVENOUS
Status: DISCONTINUED | OUTPATIENT
Start: 2022-12-17 | End: 2022-12-19 | Stop reason: HOSPADM

## 2022-12-17 RX ORDER — ONDANSETRON 2 MG/ML
4 INJECTION INTRAMUSCULAR; INTRAVENOUS EVERY 6 HOURS PRN
Status: DISCONTINUED | OUTPATIENT
Start: 2022-12-17 | End: 2022-12-19 | Stop reason: HOSPADM

## 2022-12-17 RX ORDER — ONDANSETRON 4 MG/1
4 TABLET, FILM COATED ORAL EVERY 6 HOURS PRN
Status: DISCONTINUED | OUTPATIENT
Start: 2022-12-17 | End: 2022-12-19 | Stop reason: HOSPADM

## 2022-12-17 RX ORDER — ESCITALOPRAM OXALATE 10 MG/1
20 TABLET ORAL DAILY
Status: DISCONTINUED | OUTPATIENT
Start: 2022-12-18 | End: 2022-12-19 | Stop reason: HOSPADM

## 2022-12-17 RX ORDER — SODIUM CHLORIDE 0.9 % (FLUSH) 0.9 %
10 SYRINGE (ML) INJECTION EVERY 12 HOURS SCHEDULED
Status: DISCONTINUED | OUTPATIENT
Start: 2022-12-17 | End: 2022-12-19 | Stop reason: HOSPADM

## 2022-12-17 RX ORDER — SODIUM CHLORIDE, SODIUM LACTATE, POTASSIUM CHLORIDE, CALCIUM CHLORIDE 600; 310; 30; 20 MG/100ML; MG/100ML; MG/100ML; MG/100ML
100 INJECTION, SOLUTION INTRAVENOUS CONTINUOUS
Status: ACTIVE | OUTPATIENT
Start: 2022-12-17 | End: 2022-12-18

## 2022-12-17 RX ORDER — DEXTROSE MONOHYDRATE 25 G/50ML
25 INJECTION, SOLUTION INTRAVENOUS
Status: DISCONTINUED | OUTPATIENT
Start: 2022-12-17 | End: 2022-12-19 | Stop reason: HOSPADM

## 2022-12-17 RX ORDER — INSULIN LISPRO 100 [IU]/ML
0-7 INJECTION, SOLUTION INTRAVENOUS; SUBCUTANEOUS
Status: DISCONTINUED | OUTPATIENT
Start: 2022-12-17 | End: 2022-12-19 | Stop reason: HOSPADM

## 2022-12-17 RX ORDER — CALCIUM CARBONATE 200(500)MG
2 TABLET,CHEWABLE ORAL 2 TIMES DAILY PRN
Status: DISCONTINUED | OUTPATIENT
Start: 2022-12-17 | End: 2022-12-19 | Stop reason: HOSPADM

## 2022-12-17 RX ORDER — POTASSIUM CHLORIDE 750 MG/1
40 CAPSULE, EXTENDED RELEASE ORAL AS NEEDED
Status: DISCONTINUED | OUTPATIENT
Start: 2022-12-17 | End: 2022-12-19 | Stop reason: HOSPADM

## 2022-12-17 RX ORDER — PANTOPRAZOLE SODIUM 40 MG/1
40 TABLET, DELAYED RELEASE ORAL
Status: DISCONTINUED | OUTPATIENT
Start: 2022-12-18 | End: 2022-12-19 | Stop reason: HOSPADM

## 2022-12-17 RX ORDER — ENOXAPARIN SODIUM 100 MG/ML
40 INJECTION SUBCUTANEOUS DAILY
Status: DISCONTINUED | OUTPATIENT
Start: 2022-12-17 | End: 2022-12-19 | Stop reason: HOSPADM

## 2022-12-17 RX ADMIN — MAGNESIUM SULFATE HEPTAHYDRATE 2 G: 2 INJECTION, SOLUTION INTRAVENOUS at 20:27

## 2022-12-17 RX ADMIN — MAGNESIUM SULFATE HEPTAHYDRATE 2 G: 2 INJECTION, SOLUTION INTRAVENOUS at 22:54

## 2022-12-17 RX ADMIN — MAGNESIUM SULFATE HEPTAHYDRATE 2 G: 2 INJECTION, SOLUTION INTRAVENOUS at 17:53

## 2022-12-17 RX ADMIN — SODIUM CHLORIDE 1000 ML: 9 INJECTION, SOLUTION INTRAVENOUS at 15:16

## 2022-12-17 RX ADMIN — FLUTICASONE PROPIONATE 2 SPRAY: 50 SPRAY, METERED NASAL at 18:27

## 2022-12-17 RX ADMIN — SODIUM CHLORIDE, POTASSIUM CHLORIDE, SODIUM LACTATE AND CALCIUM CHLORIDE 100 ML/HR: 600; 310; 30; 20 INJECTION, SOLUTION INTRAVENOUS at 17:53

## 2022-12-17 RX ADMIN — RELUGOLIX 120 MG: 120 TABLET, FILM COATED ORAL at 23:15

## 2022-12-17 RX ADMIN — PANCRELIPASE 36000 UNITS OF LIPASE: 36000; 180000; 114000 CAPSULE, DELAYED RELEASE PELLETS ORAL at 18:27

## 2022-12-17 NOTE — H&P
[facility] Medicine Services  HISTORY AND PHYSICAL    Patient Name: Julian Rodriguez  : 1965  MRN: [id number]  Primary Care Physician: Aaliyah Dawson MD  Date of admission: 2022    Subjective   Subjective     Chief Complaint:  Nausea, vomiting, fever, chills, weakness    HPI:  Julian Rodriguez is a 57 y.o. male with PMH significant for prostate cancer (diagnosis and radical prostatectomy , metastases to retroperitoneum and lymph nodes 2022, follows at [facility]), gastroparesis, duodenitis, HTN, HLD, DM2 who presents to [facility] ED via EMS for severe weakness, fevers/chills, and inability to keep any food down.  Patient originally started feeling poorly on Thursday with fever to 102.  His grandson had known flu, so patient went to his PCP on Friday and started Tamiflu.  He was able to take 2 doses but has not had any today as he has not been able to keep any food or fluids down.  He has not had any of his home medications in 5 days other than his chemo medication, as well as aspirin and Tylenol for fever.      Patient has been followed with GI at [facility] for ongoing nausea and vomiting since 2022 when he started a new chemo drug for prostate cancer. He has had a 50-lb weight loss since 2022.  His oncologist does not think the N/V is related to the medication and referred him for GI evaluation. Patient is supposed to have an MRI of his pancreas and is waiting on scheduling. GI also changed him from Reglan to Creon, but he has not started the Creon yet because of unavailability at the pharmacy.  He is asking if we can start the Creon here.  Patient states he last ate about half a sandwich 2 days ago.  He is feeling little better since getting IV fluids here and is hoping he can eat something soon.    Review of Systems   Constitutional: Positive for appetite change, chills, diaphoresis, fatigue and fever.   HENT: Positive for congestion and postnasal drip.  Negative for sinus pressure, sore throat and trouble swallowing.    Respiratory: Negative for cough, chest tightness and shortness of breath.    Cardiovascular: Negative for chest pain, palpitations and leg swelling.   Gastrointestinal: Positive for nausea and vomiting. Negative for abdominal pain, blood in stool and diarrhea.   Genitourinary: Negative for difficulty urinating and dysuria.   Musculoskeletal: Positive for arthralgias and myalgias.   Skin: Negative for rash and wound.   Neurological: Positive for dizziness, weakness and light-headedness. Negative for syncope and headaches.   Psychiatric/Behavioral: Positive for sleep disturbance. The patient is not nervous/anxious.         All other systems reviewed and are negative.     Personal History     Past Medical History:   Diagnosis Date   • Diabetes mellitus (HCC)    • Hypertension    • Prostate cancer (HCC)    • Umbilical hernia              Past Surgical History:   Procedure Laterality Date   • BLADDER SLING MODIFIED, ANTERIOR AND POSTERIOR VAGINAL REPAIR     • PENILE PROSTHESIS IMPLANT     • PROSTATECTOMY     • ROTATOR CUFF REPAIR         Family History: family history includes Cancer in his father, maternal grandmother, mother, and paternal grandfather. Otherwise pertinent FHx was reviewed and unremarkable.     Social History:  reports that he has never smoked. He has never used smokeless tobacco. He reports current alcohol use of about 6.0 standard drinks per week. He reports that he does not use drugs.  Social History     Social History Narrative   • Not on file       Medications:  aspirin, atorvastatin, escitalopram, fish oil, glipizide, lisinopril-hydrochlorothiazide, meloxicam, and metFORMIN    No Known Allergies    Objective   Objective     Vital Signs:   Temp:  [98.6 °F (37 °C)-98.8 °F (37.1 °C)] 98.8 °F (37.1 °C)  Heart Rate:  [60-69] 69  Resp:  [14-16] 16  BP: (119-131)/(69-95) 131/95    Physical Exam   Constitutional: Awake, alert,  ill-appearing  Eyes: PERRLA, sclerae anicteric, no conjunctival injection  HENT: NCAT, mucous membranes moist  Neck: Supple, no thyromegaly, no lymphadenopathy, trachea midline  Respiratory: Clear to auscultation bilaterally, nonlabored respirations, room air  Cardiovascular: RRR, no murmurs, rubs, or gallops, palpable pedal pulses bilaterally  Gastrointestinal: Positive bowel sounds, soft, tender RUQ, nondistended  Musculoskeletal: No bilateral ankle edema, no clubbing or cyanosis to extremities  Psychiatric: Appropriate affect, cooperative  Neurologic: Oriented x 3, strength symmetric in all extremities, Cranial Nerves grossly intact to confrontation, speech clear  Skin: No rashes    Result Review:  I have personally reviewed the results from the time of this admission to 12/17/2022 17:27 EST and agree with these findings:  [x]  Laboratory list / accordion  []  Microbiology  [x]  Radiology  [x]  EKG/Telemetry   []  Cardiology/Vascular   []  Pathology  [x]  Old records  []  Other:  Most notable findings include:     LAB RESULTS:      Lab 12/17/22  1518   WBC 5.90   HEMOGLOBIN 10.4*   HEMATOCRIT 30.5*   PLATELETS 150   NEUTROS ABS 3.60   IMMATURE GRANS (ABS) 0.01   LYMPHS ABS 1.61   MONOS ABS 0.65   EOS ABS 0.00   MCV 96.2   LACTATE 0.8         Lab 12/17/22  1518   SODIUM 133*   POTASSIUM 4.0   CHLORIDE 99   CO2 26.0   ANION GAP 8.0   BUN 9   CREATININE 0.85   EGFR 100.1   GLUCOSE 146*   CALCIUM 9.3   MAGNESIUM 1.4*   HEMOGLOBIN A1C 6.10*         Lab 12/17/22  1518   TOTAL PROTEIN 7.8   ALBUMIN 3.80   GLOBULIN 4.0   ALT (SGPT) 36   AST (SGOT) 32   BILIRUBIN 0.6   ALK PHOS 106         Lab 12/17/22  1518   TROPONIN T <0.010                 Brief Urine Lab Results  (Last result in the past 365 days)      Color   Clarity   Blood   Leuk Est   Nitrite   Protein   CREAT   Urine HCG        12/17/22 1515 Yellow   Cloudy   Negative   Negative   Negative   30 mg/dL (1+)               Microbiology Results (last 10 days)      Procedure Component Value - Date/Time    COVID PRE-OP / PRE-PROCEDURE SCREENING ORDER (NO ISOLATION) - Swab, Nasopharynx [[id number]]  (Abnormal) Collected: 12/17/22 1516    Lab Status: Final result Specimen: Swab from Nasopharynx Updated: 12/17/22 1552    Narrative:      The following orders were created for panel order COVID PRE-OP / PRE-PROCEDURE SCREENING ORDER (NO ISOLATION) - Swab, Nasopharynx.  Procedure                               Abnormality         Status                     ---------                               -----------         ------                     COVID-19 and FLU A/B PCR...[[id number]]  Abnormal            Final result                 Please view results for these tests on the individual orders.    COVID-19 and FLU A/B PCR - Swab, Nasopharynx [[id number]]  (Abnormal) Collected: 12/17/22 1516    Lab Status: Final result Specimen: Swab from Nasopharynx Updated: 12/17/22 1552     COVID19 Not Detected     Influenza A PCR Detected     Influenza B PCR Not Detected    Narrative:      Fact sheet for providers: https://www.fda.gov/media/365093/download    Fact sheet for patients: https://www.fda.gov/media/122847/download    Test performed by PCR.          XR Chest 1 View    Result Date: 12/17/2022  DATE OF EXAM: 12/17/2022 1:53 PM  PROCEDURE: XR CHEST 1 VW-  INDICATIONS: Weak/Dizzy/AMS triage protocol  COMPARISON: Chest x-ray 03/06/2020  TECHNIQUE: Single radiographic view of the chest was obtained.  FINDINGS: Lungs are normally expanded. Heart size is normal. No pneumothorax or pleural effusion or focal pulmonary parenchymal opacity. Pulmonary vessels are distinct. Bones and soft tissues are normal.      Impression: No acute cardiopulmonary abnormality.  This report was finalized on 12/17/2022 2:06 PM by Maria Esther Valencia MD.            Assessment & Plan   Assessment & Plan       Influenza A    Hypomagnesemia    Chronic anemia    Nausea and vomiting    Hypertension    Prostate cancer (HCC)    Diabetes  mellitus (HCC)    Julian Rodriguez is a 57 y.o. male with PMH significant for prostate cancer (diagnosis and radical prostatectomy 2017, metastases to retroperitoneum and lymph nodes July 2022, follows at [facility]), gastroparesis, duodenitis, HTN, HLD, DM2 who presents to [facility] ED via EMS for severe weakness, fevers/chills, and inability to keep any food down.  Patient follows with GI at [facility] for ongoing N/V since July 2022 when he started new chemo med, relugolix. He is followed by oncology at [facility]. Oncolologist there does not think chemo med is cause of N/V.    Assessment and plan:    Flu A  Weakness  -+flu A  -s/p 2 doses Tamiflu, hold for now as patient has not been able to keep food or meds down  -Tylenol as needed for fever, pain  -Supportive care  -Consider PT and OT consults pending clinical course    Nausea and vomiting, chronic  Nausea and vomiting, intractable x 5 days  Gastroparesis  Duodenitis  -Labs mostly unremarkable, lactate within normal limits  -Low suspicion for bowel obstruction, patient wants to defer imagining as he is waiting to get scheduled for MRI pancreas per his GI specialist  -Small-bowel follow-through at [facility] November 2022  -Follows with GI at [facility], prescribed Reglan which did not help, recently prescribed Creon which she has not been able to try yet because it was not available at his pharmacy  -GI consult, patient notes vomiting started July 2022 with new chemo drug relugolix, but his oncologist does not think medication is because of the N/V, as above has been following with GI at [facility]  -Per patient, last colonoscopy 5 years ago  -s/p 1 liter V bolus in ED  -IV LR x16 hours  -Zofran as needed for nausea  -Start Creon 3 times daily before meals  -Start Protonix 40 mg po daily  -Continue to hold Reglan  -Check lipase, amylase  -AM BMP, CBC    Hypomagnesemia  -Mg 1.4  -Electrolyte replacement protocol  -AM Mg    Prostate cancer  Anemia  -Has  followed at [facility] since 2017  -S/p radical prostatectomy 2017  -July 2022 imaging showed retroperitoneal metastases, janny mets  -Repeat imaging and lab work scheduled at [facility] 1/8/2023  -Defer oncology consult for now  -On relugolix (Orgovyx) 120 mg daily, okay to use home med supply as [facility] pharmacy does not carry it    HTN  HLD  -Hold home lisinopril/HCTZ, patient has been normotensive and has not had BP meds in 5 days  -Monitor BP in setting of IV fluids  -Continue statin    DM2  -A1c 6.1 today, 12/17  -Hold metformin, glipizide  -Low-dose SSI    Depression/anxiety  -Continue Lexapro      DVT prophylaxis:  Medical    CODE STATUS:    Level Of Support Discussed With: Patient  Code Status (Patient has no pulse and is not breathing): CPR (Attempt to Resuscitate)  Medical Interventions (Patient has pulse or is breathing): Full Support      Expected Discharge  12/19/2022               This note has been completed as part of a split-shared workflow.     Signature: Electronically signed by FELIPA Fisher, 12/17/22, 4:25 PM EST

## 2022-12-17 NOTE — PLAN OF CARE
Goal Outcome Evaluation:  Plan of Care Reviewed With: patient, daughter        Progress: no change  Outcome Evaluation: PT admitted from ED. VSS. Awaiting admit orders. Dtr at bedside.

## 2022-12-17 NOTE — ED PROVIDER NOTES
" EMERGENCY DEPARTMENT ENCOUNTER    Pt Name: Julian Rodriguez  MRN: [id number]  Pt :   1965  Room Number:    Date of encounter:  2022  PCP: Aaliyah Dawson MD  ED Provider: FELIPA Enrique    Historian: Patient and his wife      HPI:  Chief Complaint: Generalized weakness        Context: Julian Rodriguez is a 57 y.o. male who presents to the ED c/o increased weakness and increased vomiting.  Patient became symptomatic for flu as evidenced by fever after being exposed a day before by his granddaughter.  Mr. Chaudhry has a history of prostate adenocarcinoma with metastatic disease to his lymph nodes.  He is under the care of oncologist at [facility] in [place].  His local urologist is Dr. Luz Maria laureano.  His local gastroenterologist is Dr. Enriquez.  His primary care provider is Miranda Dawn.  He conveys that he has been taking daily oral chemotherapy since July.  Since that time, he has developed daily vomiting confirming literally vomiting 90 out of the last 90 days.  He has been referred to gastroenterology for the symptoms and they have been possibly attributed to gastroparesis.  He has been prescribed Creon.  He has had a weight loss of 45 pounds since July.  He was exposed to influenza A by his granddaughter on Thursday.  He started having fever and worsening fatigue with night sweats and minimal cough.  His vomiting increased.  \"I cannot get out of bed today\".    Review of systems is positive for chills and fever and exposure.  Negative for chest pain or cough or shortness of breath.  Positive for nausea and vomiting without abdominal pain or diarrhea or bleeding from the rectum.   systems are negative: No hematuria no dysuria.  No retention no hematemesis.  Positive for profound weakness with orthostatic dizziness without syncope.  No neurosensory complaints or focal weakness.      PAST MEDICAL HISTORY  Past Medical History:   Diagnosis Date   • Diabetes mellitus (HCC)    • Hypertension  "   • Prostate cancer (HCC)    • Umbilical hernia          PAST SURGICAL HISTORY  Past Surgical History:   Procedure Laterality Date   • BLADDER SLING MODIFIED, ANTERIOR AND POSTERIOR VAGINAL REPAIR     • PENILE PROSTHESIS IMPLANT     • PROSTATECTOMY     • ROTATOR CUFF REPAIR           FAMILY HISTORY  History reviewed. No pertinent family history.      SOCIAL HISTORY  Social History     Socioeconomic History   • Marital status:    Tobacco Use   • Smoking status: Never   • Smokeless tobacco: Never   Substance and Sexual Activity   • Alcohol use: Yes     Alcohol/week: 6.0 standard drinks     Types: 6 Glasses of wine per week   • Drug use: Never         ALLERGIES  Patient has no known allergies.        REVIEW OF SYSTEMS  Review of Systems     All systems reviewed and negative except for those discussed in HPI.       PHYSICAL EXAM    I have reviewed the triage vital signs and nursing notes.    ED Triage Vitals   Temp Heart Rate Resp BP SpO2   12/17/22 1335 12/17/22 1335 12/17/22 1335 12/17/22 1422 12/17/22 1335   98.6 °F (37 °C) 68 16 120/69 98 %      Temp src Heart Rate Source Patient Position BP Location FiO2 (%)   12/17/22 1335 12/17/22 1335 12/17/22 1422 12/17/22 1422 --   Oral Monitor Lying Right arm        Physical Exam  GENERAL:   Appears in no acute distress.  With normal vital signs.  He has multiple laments.  HENT: Nares patent.  EYES: No scleral icterus.  CV: Regular rhythm, regular rate.  No tachycardia.  No peripheral edema  RESPIRATORY: Normal effort.  No audible wheezes, rales or rhonchi.  ABDOMEN: Soft, nontender localizes diffuse mild abdominal pain without guarding.  No CVA tenderness  MUSCULOSKELETAL: No deformities.   NEURO: Alert, moves all extremities, follows commands.  SKIN: Warm, dry, no rash visualized.      LAB RESULTS  Recent Results (from the past 24 hour(s))   ECG 12 Lead ED Triage Standing Order; Weak / Dizzy / AMS    Collection Time: 12/17/22  2:05 PM   Result Value Ref Range    QT  Interval 424 ms    QTC Interval 437 ms   Urinalysis With Microscopic If Indicated (No Culture) - Urine, Clean Catch    Collection Time: 12/17/22  3:15 PM    Specimen: Urine, Clean Catch   Result Value Ref Range    Color, UA Yellow Yellow, Straw    Appearance, UA Cloudy (A) Clear    pH, UA 7.5 5.0 - 8.0    Specific Gravity, UA 1.013 1.001 - 1.030    Glucose, UA Negative Negative    Ketones, UA Negative Negative    Bilirubin, UA Negative Negative    Blood, UA Negative Negative    Protein, UA 30 mg/dL (1+) (A) Negative    Leuk Esterase, UA Negative Negative    Nitrite, UA Negative Negative    Urobilinogen, UA 0.2 E.U./dL 0.2 - 1.0 E.U./dL   Urinalysis, Microscopic Only - Urine, Clean Catch    Collection Time: 12/17/22  3:15 PM    Specimen: Urine, Clean Catch   Result Value Ref Range    RBC, UA None Seen None Seen, 0-2 /HPF    WBC, UA None Seen None Seen, 0-2 /HPF    Bacteria, UA None Seen None Seen, Trace /HPF    Squamous Epithelial Cells, UA None Seen None Seen, 0-2 /HPF    Hyaline Casts, UA None Seen 0 - 6 /LPF    Methodology Automated Microscopy    COVID-19 and FLU A/B PCR - Swab, Nasopharynx    Collection Time: 12/17/22  3:16 PM    Specimen: Nasopharynx; Swab   Result Value Ref Range    COVID19 Not Detected Not Detected - Ref. Range    Influenza A PCR Detected (A) Not Detected    Influenza B PCR Not Detected Not Detected   Comprehensive Metabolic Panel    Collection Time: 12/17/22  3:18 PM    Specimen: Blood   Result Value Ref Range    Glucose 146 (H) 65 - 99 mg/dL    BUN 9 6 - 20 mg/dL    Creatinine 0.85 0.76 - 1.27 mg/dL    Sodium 133 (L) 136 - 145 mmol/L    Potassium 4.0 3.5 - 5.2 mmol/L    Chloride 99 98 - 107 mmol/L    CO2 26.0 22.0 - 29.0 mmol/L    Calcium 9.3 8.6 - 10.5 mg/dL    Total Protein 7.8 6.0 - 8.5 g/dL    Albumin 3.80 3.50 - 5.20 g/dL    ALT (SGPT) 36 1 - 41 U/L    AST (SGOT) 32 1 - 40 U/L    Alkaline Phosphatase 106 39 - 117 U/L    Total Bilirubin 0.6 0.0 - 1.2 mg/dL    Globulin 4.0 gm/dL    A/G  Ratio 1.0 g/dL    BUN/Creatinine Ratio 10.6 7.0 - 25.0    Anion Gap 8.0 5.0 - 15.0 mmol/L    eGFR 100.1 >60.0 mL/min/1.73   Troponin    Collection Time: 12/17/22  3:18 PM    Specimen: Blood   Result Value Ref Range    Troponin T <0.010 0.000 - 0.030 ng/mL   Magnesium    Collection Time: 12/17/22  3:18 PM    Specimen: Blood   Result Value Ref Range    Magnesium 1.4 (L) 1.6 - 2.6 mg/dL   CBC Auto Differential    Collection Time: 12/17/22  3:18 PM    Specimen: Blood   Result Value Ref Range    WBC 5.90 3.40 - 10.80 10*3/mm3    RBC 3.17 (L) 4.14 - 5.80 10*6/mm3    Hemoglobin 10.4 (L) 13.0 - 17.7 g/dL    Hematocrit 30.5 (L) 37.5 - 51.0 %    MCV 96.2 79.0 - 97.0 fL    MCH 32.8 26.6 - 33.0 pg    MCHC 34.1 31.5 - 35.7 g/dL    RDW 12.7 12.3 - 15.4 %    RDW-SD 44.8 37.0 - 54.0 fl    MPV 9.8 6.0 - 12.0 fL    Platelets 150 140 - 450 10*3/mm3    Neutrophil % 61.0 42.7 - 76.0 %    Lymphocyte % 27.3 19.6 - 45.3 %    Monocyte % 11.0 5.0 - 12.0 %    Eosinophil % 0.0 (L) 0.3 - 6.2 %    Basophil % 0.5 0.0 - 1.5 %    Immature Grans % 0.2 0.0 - 0.5 %    Neutrophils, Absolute 3.60 1.70 - 7.00 10*3/mm3    Lymphocytes, Absolute 1.61 0.70 - 3.10 10*3/mm3    Monocytes, Absolute 0.65 0.10 - 0.90 10*3/mm3    Eosinophils, Absolute 0.00 0.00 - 0.40 10*3/mm3    Basophils, Absolute 0.03 0.00 - 0.20 10*3/mm3    Immature Grans, Absolute 0.01 0.00 - 0.05 10*3/mm3    nRBC 0.0 0.0 - 0.2 /100 WBC       If labs were ordered, I independently reviewed the results and considered them in treating the patient.        RADIOLOGY  XR Chest 1 View    Result Date: 12/17/2022  DATE OF EXAM: 12/17/2022 1:53 PM  PROCEDURE: XR CHEST 1 VW-  INDICATIONS: Weak/Dizzy/AMS triage protocol  COMPARISON: Chest x-ray 03/06/2020  TECHNIQUE: Single radiographic view of the chest was obtained.  FINDINGS: Lungs are normally expanded. Heart size is normal. No pneumothorax or pleural effusion or focal pulmonary parenchymal opacity. Pulmonary vessels are distinct. Bones and soft  tissues are normal.      No acute cardiopulmonary abnormality.  This report was finalized on 12/17/2022 2:06 PM by Maria Esther Valencia MD.        PROCEDURES    Procedures    ECG 12 Lead ED Triage Standing Order; Weak / Dizzy / AMS   Preliminary Result   Test Reason : WEAKNESS   Blood Pressure :   */*   mmHG   Vent. Rate :  64 BPM     Atrial Rate :  73 BPM      P-R Int :   * ms          QRS Dur :  84 ms       QT Int : 424 ms       P-R-T Axes :   *  23  46 degrees      QTc Int : 437 ms      Junctional rhythm   Abnormal ECG   No previous ECGs available      Referred By: EDMD           Confirmed By:           MEDICATIONS GIVEN IN ER    Medications   sodium chloride 0.9 % flush 10 mL (has no administration in time range)   sodium chloride 0.9 % bolus 1,000 mL (1,000 mL Intravenous New Bag 12/17/22 1516)           Orders placed during this visit:  Orders Placed This Encounter   Procedures   • COVID PRE-OP / PRE-PROCEDURE SCREENING ORDER (NO ISOLATION) - Swab, Nasopharynx   • COVID-19 and FLU A/B PCR - Swab, Nasopharynx   • XR Chest 1 View   • [place] Draw   • Comprehensive Metabolic Panel   • Troponin   • Magnesium   • Urinalysis With Microscopic If Indicated (No Culture) - Urine, Clean Catch   • CBC Auto Differential   • Urinalysis, Microscopic Only - Urine, Clean Catch   • Lactic Acid, Plasma   • Hemoglobin A1c   • NPO Diet NPO Type: Strict NPO   • Undress & Gown   • Cardiac Monitoring   • Continuous Pulse Oximetry   • Vital Signs   • Oxygen Therapy- Nasal Cannula; 2 LPM; Titrate for SPO2: 92%, Greater Than or Equal To   • ECG 12 Lead ED Triage Standing Order; Weak / Dizzy / AMS   • Insert Peripheral IV   • Initiate Observation Status   • ED Bed Request   • Tele Bed Request (TAZ / HEAVEN ONLY)   • Fall Precautions   • CBC & Differential   • Green Top (Gel)   • Lavender Top   • Gold Top - SST   • Gray Top   • Light Blue Top           ED Course:    Consultants:      ED Course as of 12/17/22 1631   Sat Dec 17, 2022   1552 Influenza A  PCR(!): Detected [MS]   1602 Patient maintains that he is too weak to go home.  Patient has a sodium 133.  He has not vomited here in the ED.  He has a heart rate of 60s with normal blood pressure.  He is not on beta-blockers.  He has normal specific gravity without ketonuria.  He has a nontender belly.  I proposed imaging to his abdomen but he wants to defer because he has had many recent abdominal studies and he currently has no abdominal pain.  I think that is reasonable. [MS]   1615 Dr Lorenzo accepts inpatient care.  Patient appreciates inpatient care.  He has urinated approximately 500ml urine  [MS]      ED Course User Index  [MS] Elma Melchor, FELIPA                  AS OF 16:31 EST VITALS:    BP - 119/78  HR - 64  TEMP - 98.6 °F (37 °C) (Oral)  O2 SATS - 96%                  DIAGNOSIS  Final diagnoses:   Influenza A   Intractable vomiting   Hypomagnesemia   History of prostate cancer         DISPOSITION    admitted      Please note that portions of this document were completed with voice recognition software.      Elma Melchor, APRN  12/17/22 1631

## 2022-12-18 ENCOUNTER — APPOINTMENT (OUTPATIENT)
Dept: MRI IMAGING | Facility: HOSPITAL | Age: 57
End: 2022-12-18

## 2022-12-18 LAB
ANION GAP SERPL CALCULATED.3IONS-SCNC: 8 MMOL/L (ref 5–15)
BUN SERPL-MCNC: 8 MG/DL (ref 6–20)
BUN/CREAT SERPL: 10.1 (ref 7–25)
CALCIUM SPEC-SCNC: 8.7 MG/DL (ref 8.6–10.5)
CHLORIDE SERPL-SCNC: 103 MMOL/L (ref 98–107)
CO2 SERPL-SCNC: 26 MMOL/L (ref 22–29)
CREAT SERPL-MCNC: 0.79 MG/DL (ref 0.76–1.27)
DEPRECATED RDW RBC AUTO: 45.9 FL (ref 37–54)
EGFRCR SERPLBLD CKD-EPI 2021: 102.3 ML/MIN/1.73
ERYTHROCYTE [DISTWIDTH] IN BLOOD BY AUTOMATED COUNT: 12.8 % (ref 12.3–15.4)
GLUCOSE BLDC GLUCOMTR-MCNC: 144 MG/DL (ref 70–130)
GLUCOSE BLDC GLUCOMTR-MCNC: 157 MG/DL (ref 70–130)
GLUCOSE BLDC GLUCOMTR-MCNC: 240 MG/DL (ref 70–130)
GLUCOSE SERPL-MCNC: 138 MG/DL (ref 65–99)
HCT VFR BLD AUTO: 30.6 % (ref 37.5–51)
HGB BLD-MCNC: 10.4 G/DL (ref 13–17.7)
MAGNESIUM SERPL-MCNC: 2.3 MG/DL (ref 1.6–2.6)
MCH RBC QN AUTO: 32.8 PG (ref 26.6–33)
MCHC RBC AUTO-ENTMCNC: 34 G/DL (ref 31.5–35.7)
MCV RBC AUTO: 96.5 FL (ref 79–97)
PLATELET # BLD AUTO: 147 10*3/MM3 (ref 140–450)
PMV BLD AUTO: 9.7 FL (ref 6–12)
POTASSIUM SERPL-SCNC: 3.8 MMOL/L (ref 3.5–5.2)
QT INTERVAL: 424 MS
QTC INTERVAL: 437 MS
RBC # BLD AUTO: 3.17 10*6/MM3 (ref 4.14–5.8)
SODIUM SERPL-SCNC: 137 MMOL/L (ref 136–145)
WBC NRBC COR # BLD: 4.72 10*3/MM3 (ref 3.4–10.8)

## 2022-12-18 PROCEDURE — A9577 INJ MULTIHANCE: HCPCS | Performed by: HOSPITALIST

## 2022-12-18 PROCEDURE — 99225 PR SBSQ OBSERVATION CARE/DAY 25 MINUTES: CPT | Performed by: HOSPITALIST

## 2022-12-18 PROCEDURE — G0378 HOSPITAL OBSERVATION PER HR: HCPCS

## 2022-12-18 PROCEDURE — 96372 THER/PROPH/DIAG INJ SC/IM: CPT

## 2022-12-18 PROCEDURE — 99215 OFFICE O/P EST HI 40 MIN: CPT | Performed by: NURSE PRACTITIONER

## 2022-12-18 PROCEDURE — 96361 HYDRATE IV INFUSION ADD-ON: CPT

## 2022-12-18 PROCEDURE — 0 GADOBENATE DIMEGLUMINE 529 MG/ML SOLUTION: Performed by: HOSPITALIST

## 2022-12-18 PROCEDURE — 80048 BASIC METABOLIC PNL TOTAL CA: CPT | Performed by: NURSE PRACTITIONER

## 2022-12-18 PROCEDURE — 25010000002 ENOXAPARIN PER 10 MG: Performed by: NURSE PRACTITIONER

## 2022-12-18 PROCEDURE — 85027 COMPLETE CBC AUTOMATED: CPT | Performed by: NURSE PRACTITIONER

## 2022-12-18 PROCEDURE — 83735 ASSAY OF MAGNESIUM: CPT | Performed by: NURSE PRACTITIONER

## 2022-12-18 PROCEDURE — 63710000001 INSULIN LISPRO (HUMAN) PER 5 UNITS: Performed by: NURSE PRACTITIONER

## 2022-12-18 PROCEDURE — 74183 MRI ABD W/O CNTR FLWD CNTR: CPT

## 2022-12-18 PROCEDURE — 82962 GLUCOSE BLOOD TEST: CPT

## 2022-12-18 RX ORDER — SUCRALFATE 1 G/1
1 TABLET ORAL
Status: DISCONTINUED | OUTPATIENT
Start: 2022-12-18 | End: 2022-12-19 | Stop reason: HOSPADM

## 2022-12-18 RX ORDER — SODIUM CHLORIDE, SODIUM LACTATE, POTASSIUM CHLORIDE, CALCIUM CHLORIDE 600; 310; 30; 20 MG/100ML; MG/100ML; MG/100ML; MG/100ML
30 INJECTION, SOLUTION INTRAVENOUS CONTINUOUS PRN
Status: CANCELLED | OUTPATIENT
Start: 2022-12-18

## 2022-12-18 RX ADMIN — PANCRELIPASE 36000 UNITS OF LIPASE: 36000; 180000; 114000 CAPSULE, DELAYED RELEASE PELLETS ORAL at 08:27

## 2022-12-18 RX ADMIN — SODIUM CHLORIDE, POTASSIUM CHLORIDE, SODIUM LACTATE AND CALCIUM CHLORIDE 100 ML/HR: 600; 310; 30; 20 INJECTION, SOLUTION INTRAVENOUS at 08:23

## 2022-12-18 RX ADMIN — PANCRELIPASE 36000 UNITS OF LIPASE: 36000; 180000; 114000 CAPSULE, DELAYED RELEASE PELLETS ORAL at 21:08

## 2022-12-18 RX ADMIN — SODIUM CHLORIDE, POTASSIUM CHLORIDE, SODIUM LACTATE AND CALCIUM CHLORIDE 100 ML/HR: 600; 310; 30; 20 INJECTION, SOLUTION INTRAVENOUS at 03:52

## 2022-12-18 RX ADMIN — Medication 10 ML: at 21:08

## 2022-12-18 RX ADMIN — SUCRALFATE 1 G: 1 TABLET ORAL at 11:39

## 2022-12-18 RX ADMIN — ENOXAPARIN SODIUM 40 MG: 40 INJECTION SUBCUTANEOUS at 08:23

## 2022-12-18 RX ADMIN — RELUGOLIX 120 MG: 120 TABLET, FILM COATED ORAL at 08:24

## 2022-12-18 RX ADMIN — GADOBENATE DIMEGLUMINE 19 ML: 529 INJECTION, SOLUTION INTRAVENOUS at 20:29

## 2022-12-18 RX ADMIN — PANCRELIPASE 36000 UNITS OF LIPASE: 36000; 180000; 114000 CAPSULE, DELAYED RELEASE PELLETS ORAL at 11:39

## 2022-12-18 RX ADMIN — PANTOPRAZOLE SODIUM 40 MG: 40 TABLET, DELAYED RELEASE ORAL at 06:41

## 2022-12-18 RX ADMIN — ATORVASTATIN CALCIUM 10 MG: 10 TABLET, FILM COATED ORAL at 08:23

## 2022-12-18 RX ADMIN — INSULIN LISPRO 3 UNITS: 100 INJECTION, SOLUTION INTRAVENOUS; SUBCUTANEOUS at 11:53

## 2022-12-18 RX ADMIN — Medication 10 ML: at 08:25

## 2022-12-18 RX ADMIN — FLUTICASONE PROPIONATE 2 SPRAY: 50 SPRAY, METERED NASAL at 08:24

## 2022-12-18 RX ADMIN — SUCRALFATE 1 G: 1 TABLET ORAL at 21:07

## 2022-12-18 NOTE — PLAN OF CARE
Problem: Adult Inpatient Plan of Care  Goal: Plan of Care Review  Outcome: Ongoing, Progressing  Flowsheets (Taken 12/18/2022 0433)  Progress: no change  Plan of Care Reviewed With: patient  Outcome Evaluation: mag replaced this shift. no complaints at this time.  Goal: Patient-Specific Goal (Individualized)  Outcome: Ongoing, Progressing  Goal: Absence of Hospital-Acquired Illness or Injury  Outcome: Ongoing, Progressing  Intervention: Identify and Manage Fall Risk  Recent Flowsheet Documentation  Taken 12/18/2022 0200 by Kylah Cherry RN  Safety Promotion/Fall Prevention:   safety round/check completed   nonskid shoes/slippers when out of bed  Taken 12/18/2022 0000 by Kylah Cherry RN  Safety Promotion/Fall Prevention:   safety round/check completed   nonskid shoes/slippers when out of bed  Taken 12/17/2022 2200 by Kylah Cherry RN  Safety Promotion/Fall Prevention:   assistive device/personal items within reach   clutter free environment maintained   fall prevention program maintained   lighting adjusted   nonskid shoes/slippers when out of bed   room organization consistent   safety round/check completed  Taken 12/17/2022 2000 by Kylah Cherry RN  Safety Promotion/Fall Prevention:   assistive device/personal items within reach   clutter free environment maintained   fall prevention program maintained   lighting adjusted   nonskid shoes/slippers when out of bed   room organization consistent   safety round/check completed  Intervention: Prevent Skin Injury  Recent Flowsheet Documentation  Taken 12/18/2022 0200 by Kylah Cherry RN  Body Position: position changed independently  Taken 12/18/2022 0000 by Kylah Cherry RN  Body Position: position changed independently  Taken 12/17/2022 2200 by Kylah Cherry RN  Body Position: position changed independently  Taken 12/17/2022 2000 by Kylah Cherry RN  Body Position: position changed independently  Intervention: Prevent and Manage VTE (Venous Thromboembolism)  Risk  Recent Flowsheet Documentation  Taken 12/18/2022 0200 by Kylha Cherry, RN  Activity Management: activity adjusted per tolerance  Taken 12/18/2022 0000 by Kylah Cherry, RN  Activity Management: activity adjusted per tolerance  Taken 12/17/2022 2200 by Kylah Cherry, RN  Activity Management: activity adjusted per tolerance  Goal: Optimal Comfort and Wellbeing  Outcome: Ongoing, Progressing  Intervention: Provide Person-Centered Care  Recent Flowsheet Documentation  Taken 12/17/2022 2000 by Kylah Cherry, RN  Trust Relationship/Rapport:   care explained   choices provided   questions answered   questions encouraged  Goal: Readiness for Transition of Care  Outcome: Ongoing, Progressing     Problem: Fluid Volume Deficit  Goal: Fluid Balance  Outcome: Ongoing, Progressing   Goal Outcome Evaluation:  Plan of Care Reviewed With: patient        Progress: no change  Outcome Evaluation: mag replaced this shift. no complaints at this time.

## 2022-12-18 NOTE — PROGRESS NOTES
[facility] Medicine Services  PROGRESS NOTE    Patient Name: Julian Rodriguez  : 1965  MRN: [id number]    Date of Admission: 2022  Primary Care Physician: Aaliyah Dawson MD    Subjective   Subjective     CC:  F/U N/V, weakness    HPI:  Patient seen this morning, started Creon last night and reports significant improvement in symptoms. Able to tolerate some food. He reports he has lost 50 lbs since July when this all started.     ROS:  Gen-no fevers, no chills  CV-no chest pain, no palpitations  Resp-no cough, no dyspnea  GI-no N/V/D, no abd pain    All other systems reviewed and negative except any additional pertinent positives and negatives as discussed in HPI.      Objective   Objective     Vital Signs:   Temp:  [97.2 °F (36.2 °C)-99 °F (37.2 °C)] 97.2 °F (36.2 °C)  Heart Rate:  [58-71] 64  Resp:  [14-16] 16  BP: (106-135)/(65-95) 120/81     Physical Exam:  Gen-no acute distress  HENT-NCAT, mucous membranes moist  CV-RRR, S1 S2 normal, no m/r/g  Resp-CTAB, no wheezes or rales  Abd-soft, NT, ND, +BS  Ext-no edema  Neuro-A&Ox3, no focal deficits  Skin-no rashes  Psych-appropriate mood      Results Reviewed:  LAB RESULTS:      Lab 22  0322 22  1518   WBC 4.72 5.90   HEMOGLOBIN 10.4* 10.4*   HEMATOCRIT 30.6* 30.5*   PLATELETS 147 150   NEUTROS ABS  --  3.60   IMMATURE GRANS (ABS)  --  0.01   LYMPHS ABS  --  1.61   MONOS ABS  --  0.65   EOS ABS  --  0.00   MCV 96.5 96.2   LACTATE  --  0.8         Lab 22  0322 22  1518   SODIUM 137 133*   POTASSIUM 3.8 4.0   CHLORIDE 103 99   CO2 26.0 26.0   ANION GAP 8.0 8.0   BUN 8 9   CREATININE 0.79 0.85   EGFR 102.3 100.1   GLUCOSE 138* 146*   CALCIUM 8.7 9.3   MAGNESIUM 2.3 1.4*   HEMOGLOBIN A1C  --  6.10*         Lab 22  1518   TOTAL PROTEIN 7.8   ALBUMIN 3.80   GLOBULIN 4.0   ALT (SGPT) 36   AST (SGOT) 32   BILIRUBIN 0.6   ALK PHOS 106   AMYLASE 48   LIPASE 36         Lab 22  1518   TROPONIN T <0.010                  Brief Urine Lab Results  (Last result in the past 365 days)      Color   Clarity   Blood   Leuk Est   Nitrite   Protein   CREAT   Urine HCG        12/17/22 1515 Yellow   Cloudy   Negative   Negative   Negative   30 mg/dL (1+)                 Microbiology Results Abnormal     None          XR Chest 1 View    Result Date: 12/17/2022  DATE OF EXAM: 12/17/2022 1:53 PM  PROCEDURE: XR CHEST 1 VW-  INDICATIONS: Weak/Dizzy/AMS triage protocol  COMPARISON: Chest x-ray 03/06/2020  TECHNIQUE: Single radiographic view of the chest was obtained.  FINDINGS: Lungs are normally expanded. Heart size is normal. No pneumothorax or pleural effusion or focal pulmonary parenchymal opacity. Pulmonary vessels are distinct. Bones and soft tissues are normal.      Impression: No acute cardiopulmonary abnormality.  This report was finalized on 12/17/2022 2:06 PM by Maria Esther Valencia MD.            I have reviewed the medications:  Scheduled Meds:relugolix, 120 mg, Oral, Daily  atorvastatin, 10 mg, Oral, Daily  enoxaparin, 40 mg, Subcutaneous, Daily  escitalopram, 20 mg, Oral, Daily  fluticasone, 2 spray, Each Nare, Daily  insulin lispro, 0-7 Units, Subcutaneous, TID AC  Pancrelipase (Lip-Prot-Amyl), 36,000 units of lipase, Oral, TID With Meals  pantoprazole, 40 mg, Oral, Q AM  sodium chloride, 10 mL, Intravenous, Q12H      Continuous Infusions:   PRN Meds:.•  acetaminophen  •  calcium carbonate  •  dextrose  •  dextrose  •  glucagon (human recombinant)  •  magnesium sulfate **OR** magnesium sulfate **OR** magnesium sulfate  •  ondansetron **OR** ondansetron  •  potassium chloride **OR** potassium chloride **OR** potassium chloride  •  sodium chloride  •  sodium chloride  •  sodium chloride    Assessment & Plan   Assessment & Plan     Active Hospital Problems    Diagnosis  POA   • **Influenza A [J10.1]  Yes   • Hypomagnesemia [E83.42]  Yes   • Chronic anemia [D64.9]  Yes   • Nausea and vomiting [R11.2]  Yes   • Hypertension [I10]   Yes   • Prostate cancer (HCC) [C61]  Yes   • Diabetes mellitus (HCC) [E11.9]  Yes      Resolved Hospital Problems   No resolved problems to display.        Brief Hospital Course to date:  Julian Rodriguez is a 57 y.o. male 57 y.o. male with PMH significant for prostate cancer (diagnosis and radical prostatectomy 2017, metastases to retroperitoneum and lymph nodes July 2022, follows at [facility]), suspected gastroparesis, duodenitis, HTN, HLD, and DM2 who presents to [facility] ED via EMS for severe weakness, fevers/chills, and inability to keep any food down.  Patient follows with GI at [facility] for ongoing N/V since July 2022 when he started new chemo med, relugolix (Orgovyx). He is followed by Oncology at [facility]. Oncologist has told him he does not think chemo med is cause of N/V.     This patient's problems and plans were partially entered by my partner and updated as appropriate by me 12/18/22.    Influenza A  Generalized weakness  -Reports exposure to grandson who has the flu  -s/p 2 doses Tamiflu, hold for now as patient has not been able to keep food or meds down  -Tylenol as needed for fever, pain  -Antiemetics  -Supportive care  -Ambulate, consider PT/OT evaluation based on progress     Nausea and vomiting, chronic  Nausea and vomiting, intractable x 5 days  Suspected gastroparesis  Duodenitis  -Labs mostly unremarkable, lactate within normal limits  -Low suspicion for bowel obstruction, patient wants to defer imaging as he is waiting to get scheduled for MRI pancreas per his GI specialist  -EGD with [facility] GI showed duodenitis, continue PPI and Carafate  -Small-bowel follow-through and gastric emptying study done with [facility] GI November 2022 demonstrated prolonged emptying of the stomach, was started on Reglan   -Reglan apparently did not help, so was recently prescribed Creon which he states his pharmacy won't have in stock until 12/19/22 - Creon started here and patient does  report significant improvement  -Patient notes vomiting started July 2022 with new chemo drug relugolix (Orgovyx), but his oncologist does not think medication is causing the N/V, had recommended GI workup   -Per patient, last colonoscopy 5 years ago  -IV fluids  -Zofran as needed for nausea  -Continue to hold Reglan  -GI consult for today     Hypomagnesemia  -Mg 1.4  -Electrolyte replacement protocol     Metastatic prostate cancer  Anemia  -Has followed at [facility] since 2017  -s/p radical prostatectomy 2017  -July 2022 imaging showed retroperitoneal metastases, janny mets  -Repeat imaging and lab work scheduled at [facility] 1/8/2023  -Defer Oncology consult for now  -On relugolix (Orgovyx) 120 mg daily, okay to use home med supply as [facility] pharmacy does not carry it     HTN  HLD  -Hold home Lisinopril/HCTZ, patient has been normotensive and has not had BP meds in 5 days  -Monitor BP in setting of IV fluids  -Continue statin     DM2  -HbA1c 6.1%, 12/17/22  -Hold metformin, glipizide  -Low-dose SSI     Depression/anxiety  -Continue Lexapro    Expected Discharge Location and Transportation: home  Expected Discharge 12/18/22 or 12/19/22       DVT prophylaxis:  Medical DVT prophylaxis orders are present.     AM-PAC 6 Clicks Score (PT): 20 (12/18/22 0800)    CODE STATUS:   Code Status and Medical Interventions:   Ordered at: 12/17/22 1720     Level Of Support Discussed With:    Patient     Code Status (Patient has no pulse and is not breathing):    CPR (Attempt to Resuscitate)     Medical Interventions (Patient has pulse or is breathing):    Full Support       Alycia Rodrigez MD  12/18/22

## 2022-12-18 NOTE — CONSULTS
[facility] Gastroenterology Consult    Referring Provider: No ref. provider found    PCP: Aaliyah Dawson MD    Reason for Consultation: Chronic nausea and vomiting, gastroparesis, prostate cancer with retroperitoneal metastasis    Chief complaint: Nausea and vomiting    History of present illness:    Julian Rodriguez is a 57 y.o. male who is admitted with intractable nausea and vomiting.  Patient notes that he has had almost daily nausea and vomiting for the past several months following initiation of a new oral chemotherapy pill for his metastatic prostate cancer.  Notes he is lost a significant amount of weight since starting this new medication and is vomiting, some days, up to 6 or 7 times daily.  Reports that his symptoms have been worse over the past 5 days been able to maintain any p.o. intake.  Patient does not report any alleviating or exacerbating factors.  Has tried usual antiemetics with no response; reports worsening symptoms with use of Reglan.  Patient reports that his primary gastroenterology team has been working up this nausea several months now he has done so with an EGD, gastric emptying scan, and upper GI series: Notes ulcerations on EGD, decreased gastric emptying on GES scan.  Notes his primary team is trialing him on Creon and will need to pursue an MRI of pancreas but unfortunately, that has been held up in prior authorization with insurance. Past medical, surgical, social, and family histories are reviewed for accuracy.  No documented alleviating or exacerbating factors.  Does not endorse pain at time of exam.    Allergies:  Patient has no known allergies.    Scheduled Meds:  relugolix, 120 mg, Oral, Daily  atorvastatin, 10 mg, Oral, Daily  enoxaparin, 40 mg, Subcutaneous, Daily  escitalopram, 20 mg, Oral, Daily  fluticasone, 2 spray, Each Nare, Daily  insulin lispro, 0-7 Units, Subcutaneous, TID AC  Pancrelipase (Lip-Prot-Amyl), 36,000 units of lipase, Oral, TID With Meals  pantoprazole, 40  mg, Oral, Q AM  sodium chloride, 10 mL, Intravenous, Q12H  sucralfate, 1 g, Oral, 4x Daily AC & at Bedtime         Infusions:       PRN Meds:  •  acetaminophen  •  calcium carbonate  •  dextrose  •  dextrose  •  glucagon (human recombinant)  •  magnesium sulfate **OR** magnesium sulfate **OR** magnesium sulfate  •  ondansetron **OR** ondansetron  •  potassium chloride **OR** potassium chloride **OR** potassium chloride  •  sodium chloride  •  sodium chloride  •  sodium chloride    Home Meds:  Medications Prior to Admission   Medication Sig Dispense Refill Last Dose   • aspirin (aspirin) 81 MG EC tablet Take 81 mg by mouth Daily.      • atorvastatin (LIPITOR) 10 MG tablet Take 10 mg by mouth Daily.      • escitalopram (LEXAPRO) 20 MG tablet Take 20 mg by mouth Daily.      • glipizide (GLUCOTROL XL) 2.5 MG 24 hr tablet       • lisinopril-hydrochlorothiazide (PRINZIDE,ZESTORETIC) 10-12.5 MG per tablet Take 1 tablet by mouth Daily.      • meloxicam (MOBIC) 15 MG tablet 1 PO Daily with food. 60 tablet 1    • metFORMIN (GLUCOPHAGE) 1000 MG tablet Take 1,000 mg by mouth 2 (Two) Times a Day.      • Omega-3 Fatty Acids (fish oil) 1000 MG capsule capsule Take 1,000 mg by mouth.          ROS: Review of Systems   Constitutional: Positive for activity change, appetite change, fatigue and unexpected weight change. Negative for chills, diaphoresis and fever.   HENT: Negative for sore throat, trouble swallowing and voice change.    Eyes: Negative.    Respiratory: Negative for apnea, cough, choking, chest tightness, shortness of breath, wheezing and stridor.    Cardiovascular: Negative for chest pain, palpitations and leg swelling.   Gastrointestinal: Positive for abdominal pain, nausea and vomiting. Negative for abdominal distention, anal bleeding, blood in stool, constipation, diarrhea and rectal pain.   Endocrine: Negative.    Genitourinary: Negative.    Musculoskeletal: Negative.    Skin: Negative for color change, pallor, rash  "and wound.   Allergic/Immunologic: Negative.    Neurological: Negative.    Hematological: Negative.    Psychiatric/Behavioral: Negative.    All other systems reviewed and are negative.      PAST MED HX:  Past Medical History:   Diagnosis Date   • Diabetes mellitus (HCC)    • Hypertension    • Prostate cancer (HCC)    • Umbilical hernia        PAST SURG HX:  Past Surgical History:   Procedure Laterality Date   • BLADDER SLING MODIFIED, ANTERIOR AND POSTERIOR VAGINAL REPAIR     • PENILE PROSTHESIS IMPLANT     • PROSTATECTOMY     • ROTATOR CUFF REPAIR         FAM HX:  Family History   Problem Relation Age of Onset   • Cancer Mother    • Cancer Father    • Cancer Maternal Grandmother    • Cancer Paternal Grandfather        SOC HX:  Social History     Socioeconomic History   • Marital status:    Tobacco Use   • Smoking status: Never   • Smokeless tobacco: Never   Substance and Sexual Activity   • Alcohol use: Yes     Alcohol/week: 6.0 standard drinks     Types: 6 Glasses of wine per week   • Drug use: Never   • Sexual activity: Defer       PHYSICAL EXAM  /81 (BP Location: Right arm, Patient Position: Lying)   Pulse 64   Temp 97.2 °F (36.2 °C) (Axillary)   Resp 16   Ht 175.3 cm (69\")   Wt 91.2 kg (201 lb)   SpO2 94%   BMI 29.68 kg/m²   Wt Readings from Last 3 Encounters:   12/17/22 91.2 kg (201 lb)   02/25/22 107 kg (235 lb 6.4 oz)   11/30/21 108 kg (238 lb)   ,body mass index is 29.68 kg/m².  Physical Exam  Vitals and nursing note reviewed.   Constitutional:       General: He is not in acute distress.     Appearance: Normal appearance. He is normal weight. He is not ill-appearing or toxic-appearing.   HENT:      Head: Normocephalic and atraumatic.   Eyes:      General: No scleral icterus.     Extraocular Movements: Extraocular movements intact.      Conjunctiva/sclera: Conjunctivae normal.      Pupils: Pupils are equal, round, and reactive to light.   Cardiovascular:      Rate and Rhythm: Normal rate " and regular rhythm.      Pulses: Normal pulses.      Heart sounds: Normal heart sounds.   Pulmonary:      Effort: Pulmonary effort is normal. No respiratory distress.      Breath sounds: Normal breath sounds.   Abdominal:      General: Abdomen is flat. Bowel sounds are normal. There is no distension.      Palpations: Abdomen is soft. There is no mass.      Tenderness: There is no abdominal tenderness. There is no guarding or rebound.      Hernia: No hernia is present.   Genitourinary:     Comments: defer  Musculoskeletal:         General: Normal range of motion.      Right lower leg: No edema.      Left lower leg: No edema.   Skin:     General: Skin is warm and dry.      Capillary Refill: Capillary refill takes less than 2 seconds.      Coloration: Skin is pale. Skin is not jaundiced.   Neurological:      General: No focal deficit present.      Mental Status: He is alert and oriented to person, place, and time.   Psychiatric:         Mood and Affect: Mood normal.         Behavior: Behavior normal.         Thought Content: Thought content normal.         Judgment: Judgment normal.         Results Review:   I reviewed the patient's new clinical results.  I reviewed the patient's new imaging results and agree with the interpretation.  I personally viewed and interpreted the patient's EKG/Telemetry data    Lab Results   Component Value Date    WBC 4.72 12/18/2022    HGB 10.4 (L) 12/18/2022    HGB 10.4 (L) 12/17/2022    HGB 13.3 (L) 07/11/2022    HCT 30.6 (L) 12/18/2022    MCV 96.5 12/18/2022     12/18/2022       Lab Results   Component Value Date    INR 1.01 07/11/2022    INR 0.97 03/04/2019       Lab Results   Component Value Date    GLUCOSE 138 (H) 12/18/2022    BUN 8 12/18/2022    CREATININE 0.79 12/18/2022    EGFRIFNONA 81 07/11/2022    EGFRIFAFRI 93 07/11/2022    BCR 10.1 12/18/2022     12/18/2022    K 3.8 12/18/2022    CO2 26.0 12/18/2022    CALCIUM 8.7 12/18/2022    ALBUMIN 3.80 12/17/2022    ALKPHOS  106 12/17/2022    BILITOT 0.6 12/17/2022    ALT 36 12/17/2022    AST 32 12/17/2022       XR Chest 1 View    Result Date: 12/17/2022  DATE OF EXAM: 12/17/2022 1:53 PM  PROCEDURE: XR CHEST 1 VW-  INDICATIONS: Weak/Dizzy/AMS triage protocol  COMPARISON: Chest x-ray 03/06/2020  TECHNIQUE: Single radiographic view of the chest was obtained.  FINDINGS: Lungs are normally expanded. Heart size is normal. No pneumothorax or pleural effusion or focal pulmonary parenchymal opacity. Pulmonary vessels are distinct. Bones and soft tissues are normal.      No acute cardiopulmonary abnormality.  This report was finalized on 12/17/2022 2:06 PM by Maria Esther Valencia MD.      COVID19   Date Value Ref Range Status   12/17/2022 Not Detected Not Detected - Ref. Range Final     ASSESSMENTS/PLANS  1.  Acute on chronic nausea vomiting, intractable for past 5 days  2.  Abnormal gastric emptying scan concerning for gastroparesis  3.  Duodenitis, noted on most recent CT imaging; history of duodenal ulcers on most recent EGD  4.  Metastatic prostate cancer, on Relugolix  5.  Influenza AGerman Rodriguez is a 57 y.o. male presented to hospital with intractable nausea vomiting for past 5 days.  However, upon further review, patient has been having chronic nausea and vomiting for the past few months following initiation of a new chemotherapy pill.  Suspect patient's nausea and vomiting is multifactorial in nature stemming from duodenal inflammation as well as medication induced.  Noted that his symptoms are somewhat improved while on Creon.  Given severity of symptoms and persistent nature, recommend EGD for reevaluation of upper GI tract.  Additionally, noted that patient's outpatient gastroenterology team is working him up for an MRI of pancreas; we will go ahead and obtain this while inpatient to facilitate patient's care.    >>> N.p.o. at midnight  >>> Obtain informed consent for esophagogastroduodenoscopy  >>> MRI abdomen and pelvis, pancreas  protocol  >>> IV PPI twice daily  >>> Carafate slurry ordered  >>> Trial GI cocktail and evaluate response  >>> Pancreas fecal elastase ordered  >>> For gastroparesis, recommend trial of gingerroot capsules 3 times daily    I discussed the patient's findings and my recommendations with patient, family and nursing staff.    Jono Meza, APRN  12/18/22  14:23 EST

## 2022-12-19 ENCOUNTER — ANESTHESIA EVENT (OUTPATIENT)
Dept: GASTROENTEROLOGY | Facility: HOSPITAL | Age: 57
End: 2022-12-19

## 2022-12-19 ENCOUNTER — ANESTHESIA (OUTPATIENT)
Dept: GASTROENTEROLOGY | Facility: HOSPITAL | Age: 57
End: 2022-12-19

## 2022-12-19 VITALS
DIASTOLIC BLOOD PRESSURE: 81 MMHG | SYSTOLIC BLOOD PRESSURE: 124 MMHG | TEMPERATURE: 98.8 F | RESPIRATION RATE: 16 BRPM | HEART RATE: 66 BPM | OXYGEN SATURATION: 96 % | WEIGHT: 201 LBS | HEIGHT: 69 IN | BODY MASS INDEX: 29.77 KG/M2

## 2022-12-19 PROBLEM — J10.1 INFLUENZA A: Status: RESOLVED | Noted: 2022-12-17 | Resolved: 2022-12-19

## 2022-12-19 PROBLEM — R11.2 NAUSEA AND VOMITING: Status: RESOLVED | Noted: 2022-12-17 | Resolved: 2022-12-19

## 2022-12-19 PROBLEM — E83.42 HYPOMAGNESEMIA: Status: RESOLVED | Noted: 2022-12-17 | Resolved: 2022-12-19

## 2022-12-19 LAB
GLUCOSE BLDC GLUCOMTR-MCNC: 152 MG/DL (ref 70–130)
GLUCOSE BLDC GLUCOMTR-MCNC: 211 MG/DL (ref 70–130)
GLUCOSE BLDC GLUCOMTR-MCNC: 223 MG/DL (ref 70–130)

## 2022-12-19 PROCEDURE — 99217 PR OBSERVATION CARE DISCHARGE MANAGEMENT: CPT | Performed by: HOSPITALIST

## 2022-12-19 PROCEDURE — 82653 EL-1 FECAL QUANTITATIVE: CPT | Performed by: INTERNAL MEDICINE

## 2022-12-19 PROCEDURE — G0378 HOSPITAL OBSERVATION PER HR: HCPCS

## 2022-12-19 PROCEDURE — 25010000002 PROPOFOL 10 MG/ML EMULSION: Performed by: NURSE ANESTHETIST, CERTIFIED REGISTERED

## 2022-12-19 PROCEDURE — 97161 PT EVAL LOW COMPLEX 20 MIN: CPT

## 2022-12-19 PROCEDURE — 43235 EGD DIAGNOSTIC BRUSH WASH: CPT | Performed by: INTERNAL MEDICINE

## 2022-12-19 PROCEDURE — 25010000002 ENOXAPARIN PER 10 MG: Performed by: INTERNAL MEDICINE

## 2022-12-19 PROCEDURE — 63710000001 INSULIN LISPRO (HUMAN) PER 5 UNITS: Performed by: INTERNAL MEDICINE

## 2022-12-19 PROCEDURE — 82962 GLUCOSE BLOOD TEST: CPT

## 2022-12-19 PROCEDURE — 87046 STOOL CULTR AEROBIC BACT EA: CPT | Performed by: INTERNAL MEDICINE

## 2022-12-19 PROCEDURE — 87427 SHIGA-LIKE TOXIN AG IA: CPT | Performed by: INTERNAL MEDICINE

## 2022-12-19 PROCEDURE — 87045 FECES CULTURE AEROBIC BACT: CPT | Performed by: INTERNAL MEDICINE

## 2022-12-19 RX ORDER — SUCRALFATE 1 G/1
1 TABLET ORAL
Start: 2022-12-19

## 2022-12-19 RX ORDER — IPRATROPIUM BROMIDE AND ALBUTEROL SULFATE 2.5; .5 MG/3ML; MG/3ML
3 SOLUTION RESPIRATORY (INHALATION) ONCE AS NEEDED
Status: DISCONTINUED | OUTPATIENT
Start: 2022-12-19 | End: 2022-12-19 | Stop reason: HOSPADM

## 2022-12-19 RX ORDER — ONDANSETRON 2 MG/ML
4 INJECTION INTRAMUSCULAR; INTRAVENOUS ONCE AS NEEDED
Status: DISCONTINUED | OUTPATIENT
Start: 2022-12-19 | End: 2022-12-19 | Stop reason: HOSPADM

## 2022-12-19 RX ORDER — LIDOCAINE HYDROCHLORIDE 10 MG/ML
INJECTION, SOLUTION EPIDURAL; INFILTRATION; INTRACAUDAL; PERINEURAL AS NEEDED
Status: DISCONTINUED | OUTPATIENT
Start: 2022-12-19 | End: 2022-12-19 | Stop reason: SURG

## 2022-12-19 RX ORDER — PROPOFOL 10 MG/ML
VIAL (ML) INTRAVENOUS AS NEEDED
Status: DISCONTINUED | OUTPATIENT
Start: 2022-12-19 | End: 2022-12-19 | Stop reason: SURG

## 2022-12-19 RX ORDER — SODIUM CHLORIDE, SODIUM LACTATE, POTASSIUM CHLORIDE, CALCIUM CHLORIDE 600; 310; 30; 20 MG/100ML; MG/100ML; MG/100ML; MG/100ML
INJECTION, SOLUTION INTRAVENOUS CONTINUOUS PRN
Status: DISCONTINUED | OUTPATIENT
Start: 2022-12-19 | End: 2022-12-19 | Stop reason: SURG

## 2022-12-19 RX ORDER — OMEPRAZOLE 40 MG/1
40 CAPSULE, DELAYED RELEASE ORAL DAILY
Start: 2022-12-19

## 2022-12-19 RX ADMIN — INSULIN LISPRO 2 UNITS: 100 INJECTION, SOLUTION INTRAVENOUS; SUBCUTANEOUS at 09:35

## 2022-12-19 RX ADMIN — FLUTICASONE PROPIONATE 2 SPRAY: 50 SPRAY, METERED NASAL at 09:42

## 2022-12-19 RX ADMIN — LIDOCAINE HYDROCHLORIDE 100 MG: 10 INJECTION, SOLUTION EPIDURAL; INFILTRATION; INTRACAUDAL at 08:44

## 2022-12-19 RX ADMIN — INSULIN LISPRO 3 UNITS: 100 INJECTION, SOLUTION INTRAVENOUS; SUBCUTANEOUS at 11:36

## 2022-12-19 RX ADMIN — SUCRALFATE 1 G: 1 TABLET ORAL at 11:36

## 2022-12-19 RX ADMIN — SUCRALFATE 1 G: 1 TABLET ORAL at 09:37

## 2022-12-19 RX ADMIN — PANCRELIPASE 36000 UNITS OF LIPASE: 36000; 180000; 114000 CAPSULE, DELAYED RELEASE PELLETS ORAL at 09:36

## 2022-12-19 RX ADMIN — PROPOFOL 80 MG: 10 INJECTION, EMULSION INTRAVENOUS at 08:44

## 2022-12-19 RX ADMIN — ENOXAPARIN SODIUM 40 MG: 40 INJECTION SUBCUTANEOUS at 09:36

## 2022-12-19 RX ADMIN — RELUGOLIX 120 MG: 120 TABLET, FILM COATED ORAL at 09:41

## 2022-12-19 RX ADMIN — PANCRELIPASE 36000 UNITS OF LIPASE: 36000; 180000; 114000 CAPSULE, DELAYED RELEASE PELLETS ORAL at 11:43

## 2022-12-19 RX ADMIN — ATORVASTATIN CALCIUM 10 MG: 10 TABLET, FILM COATED ORAL at 09:37

## 2022-12-19 RX ADMIN — PROPOFOL 120 MCG/KG/MIN: 10 INJECTION, EMULSION INTRAVENOUS at 08:45

## 2022-12-19 RX ADMIN — Medication 10 ML: at 09:45

## 2022-12-19 RX ADMIN — SODIUM CHLORIDE, POTASSIUM CHLORIDE, SODIUM LACTATE AND CALCIUM CHLORIDE: 600; 310; 30; 20 INJECTION, SOLUTION INTRAVENOUS at 08:44

## 2022-12-19 NOTE — PLAN OF CARE
Problem: Adult Inpatient Plan of Care  Goal: Plan of Care Review  Outcome: Met  Goal: Patient-Specific Goal (Individualized)  Outcome: Met  Goal: Absence of Hospital-Acquired Illness or Injury  Outcome: Met  Intervention: Identify and Manage Fall Risk  Recent Flowsheet Documentation  Taken 12/19/2022 1400 by Aida Becker RN  Safety Promotion/Fall Prevention:   activity supervised   assistive device/personal items within reach   clutter free environment maintained   fall prevention program maintained   nonskid shoes/slippers when out of bed   room organization consistent   safety round/check completed  Taken 12/19/2022 1200 by Aida Becker RN  Safety Promotion/Fall Prevention:   activity supervised   assistive device/personal items within reach   clutter free environment maintained   fall prevention program maintained   nonskid shoes/slippers when out of bed   room organization consistent   safety round/check completed  Taken 12/19/2022 1000 by Aida Becker RN  Safety Promotion/Fall Prevention:   activity supervised   assistive device/personal items within reach   clutter free environment maintained   fall prevention program maintained   nonskid shoes/slippers when out of bed   room organization consistent   safety round/check completed  Taken 12/19/2022 0930 by Aida Becker RN  Safety Promotion/Fall Prevention:   activity supervised   assistive device/personal items within reach   clutter free environment maintained   fall prevention program maintained   nonskid shoes/slippers when out of bed   room organization consistent   safety round/check completed  Taken 12/19/2022 0800 by Aida Becker RN  Safety Promotion/Fall Prevention: patient off unit  Taken 12/19/2022 0730 by Aida Becker RN  Safety Promotion/Fall Prevention: patient off unit  Intervention: Prevent Skin Injury  Recent Flowsheet Documentation  Taken 12/19/2022 1400 by Aida Becker RN  Body Position: position changed  independently  Skin Protection:   adhesive use limited   incontinence pads utilized   tubing/devices free from skin contact  Taken 12/19/2022 1200 by Aida Becker RN  Body Position: position changed independently  Skin Protection:   adhesive use limited   incontinence pads utilized   tubing/devices free from skin contact  Taken 12/19/2022 1000 by Aida Becker RN  Body Position: position changed independently  Skin Protection:   adhesive use limited   incontinence pads utilized   tubing/devices free from skin contact  Taken 12/19/2022 0930 by Aida Becker RN  Body Position: position changed independently  Skin Protection:   adhesive use limited   incontinence pads utilized   tubing/devices free from skin contact  Intervention: Prevent and Manage VTE (Venous Thromboembolism) Risk  Recent Flowsheet Documentation  Taken 12/19/2022 1400 by Aida Becker RN  Activity Management: activity adjusted per tolerance  Taken 12/19/2022 1200 by Aida Becker RN  Activity Management: activity adjusted per tolerance  Taken 12/19/2022 1000 by Aida Becker RN  Activity Management: activity adjusted per tolerance  Taken 12/19/2022 0930 by Aida Becker RN  Activity Management: activity adjusted per tolerance  Range of Motion: active ROM (range of motion) encouraged  Intervention: Prevent Infection  Recent Flowsheet Documentation  Taken 12/19/2022 1400 by Aida Becker RN  Infection Prevention:   environmental surveillance performed   equipment surfaces disinfected   hand hygiene promoted   rest/sleep promoted   single patient room provided  Taken 12/19/2022 1200 by Aida Becker RN  Infection Prevention:   environmental surveillance performed   equipment surfaces disinfected   hand hygiene promoted   rest/sleep promoted   single patient room provided  Taken 12/19/2022 1000 by Aida Becker RN  Infection Prevention:   environmental surveillance performed   equipment surfaces disinfected   hand hygiene promoted    single patient room provided   rest/sleep promoted  Taken 12/19/2022 0930 by Aida Becker RN  Infection Prevention:   environmental surveillance performed   equipment surfaces disinfected   hand hygiene promoted   rest/sleep promoted   single patient room provided  Goal: Optimal Comfort and Wellbeing  Outcome: Met  Intervention: Provide Person-Centered Care  Recent Flowsheet Documentation  Taken 12/19/2022 0930 by Aida Becker RN  Trust Relationship/Rapport:   care explained   choices provided   empathic listening provided   questions answered   thoughts/feelings acknowledged  Goal: Readiness for Transition of Care  Outcome: Met     Problem: Fluid Volume Deficit  Goal: Fluid Balance  Outcome: Met   Goal Outcome Evaluation:

## 2022-12-19 NOTE — THERAPY DISCHARGE NOTE
Patient Name: Julian Rodriguez  : 1965    MRN: [id number]                              Today's Date: 2022       Admit Date: 2022    Visit Dx:     ICD-10-CM ICD-9-CM   1. Influenza A  J10.1 487.1   2. Intractable vomiting  R11.10 536.2   3. Hypomagnesemia  E83.42 275.2   4. History of prostate cancer  Z85.46 V10.46   5. Bilious vomiting with nausea  R11.14 787.04     Patient Active Problem List   Diagnosis   • Influenza A   • Hypomagnesemia   • Chronic anemia   • Nausea and vomiting   • Hypertension   • Prostate cancer (HCC)   • Diabetes mellitus (HCC)     Past Medical History:   Diagnosis Date   • Diabetes mellitus (HCC)    • Hypertension    • Prostate cancer (HCC)    • Umbilical hernia      Past Surgical History:   Procedure Laterality Date   • BLADDER SLING MODIFIED, ANTERIOR AND POSTERIOR VAGINAL REPAIR     • PENILE PROSTHESIS IMPLANT     • PROSTATECTOMY     • ROTATOR CUFF REPAIR        General Information     Row Name 22 1116          Physical Therapy Time and Intention    Document Type discharge evaluation/summary  -     Mode of Treatment physical therapy  -ML     Row Name 22 1116          General Information    Patient Profile Reviewed yes  -ML     Prior Level of Function independent:;all household mobility;community mobility;gait;transfer;ADL's;driving  -ML     Existing Precautions/Restrictions no known precautions/restrictions  -ML     Barriers to Rehab none identified  -ML     Row Name 22 1116          Living Environment    People in Home spouse  -ML     Row Name 22 1116          Home Main Entrance    Number of Stairs, Main Entrance two  -ML     Stair Railings, Main Entrance none  -ML     Row Name 22 1116          Cognition    Orientation Status (Cognition) oriented x 4  -ML           User Key  (r) = Recorded By, (t) = Taken By, (c) = Cosigned By    Initials Name Provider Type    ML Anna Deleon Physical Therapist               Mobility     Row Name 22  1117          Bed Mobility    Comment, (Bed Mobility) Patient found standing up in room.  -ML     Row Name 12/19/22 1117          Sit-Stand Transfer    Sit-Stand [place] (Transfers) independent  -ML     Row Name 12/19/22 1117          Gait/Stairs (Locomotion)    [place] Level (Gait) independent  -ML     Distance in Feet (Gait) 400  -ML     Comment, (Gait/Stairs) Patient ambulated without gait deficits, normalizd gait speed and patient denies dyspnea during ambulation.  -ML           User Key  (r) = Recorded By, (t) = Taken By, (c) = Cosigned By    Initials Name Provider Type    ML Anna Deleon Physical Therapist               Obj/Interventions     Row Name 12/19/22 1118          Range of Motion Comprehensive    General Range of Motion no range of motion deficits identified  -ML     Row Name 12/19/22 1118          Strength Comprehensive (MMT)    General Manual Muscle Testing (MMT) Assessment no strength deficits identified  -ML     [facility] Name 12/19/22 1118          Balance    Balance Assessment sitting static balance;sit to stand dynamic balance;standing static balance;standing dynamic balance  -ML     Static Sitting Balance independent  -ML     Position, Sitting Balance supported;sitting in chair  -ML     Sit to Stand Dynamic Balance independent  -ML     Static Standing Balance independent  -ML     Dynamic Standing Balance independent  -ML     Position/Device Used, Standing Balance unsupported  -ML     Balance Interventions sitting;standing;sit to stand;supported;static;dynamic;occupation based/functional task  -ML           User Key  (r) = Recorded By, (t) = Taken By, (c) = Cosigned By    Initials Name Provider Type    ML Anna Deleon Physical Therapist               Goals/Plan    No documentation.                Clinical Impression     Row Name 12/19/22 1119          Pain    Pretreatment Pain Rating 0/10 - no pain  -ML     Posttreatment Pain Rating 0/10 - no pain  -ML     [facility] Name 12/19/22 1119           Plan of Care Review    Plan of Care Reviewed With patient  -ML     Outcome Evaluation Physical therapy evaluation complete. The patient presents near baseline for functional mobility. Patient independent with transfers and ambulation. No acute PT needs identified. At hospital discharge recommend patient return home.  -ML     Row Name 12/19/22 1119          Therapy Assessment/Plan (PT)    Criteria for Skilled Interventions Met (PT) no;no problems identified which require skilled intervention;does not meet criteria for skilled intervention  -ML     Therapy Frequency (PT) evaluation only  -ML     Row Name 12/19/22 1119          Vital Signs    Pre Patient Position Standing  -ML     Intra Patient Position Standing  -ML     Post Patient Position Sitting  -ML     Row Name 12/19/22 1119          Positioning and Restraints    Pre-Treatment Position standing in room  -ML     Post Treatment Position chair  -ML     In Chair sitting;call light within reach;encouraged to call for assist  -ML           User Key  (r) = Recorded By, (t) = Taken By, (c) = Cosigned By    Initials Name Provider Type     Anna Deleon Physical Therapist               Outcome Measures     Row Name 12/19/22 1120 12/19/22 0930       How much help from another person do you currently need...    Turning from your back to your side while in flat bed without using bedrails? 4  -ML 4  -EK    Moving from lying on back to sitting on the side of a flat bed without bedrails? 4  -ML 4  -EK    Moving to and from a bed to a chair (including a wheelchair)? 4  -ML 3  -EK    Standing up from a chair using your arms (e.g., wheelchair, bedside chair)? 4  -ML 3  -EK    Climbing 3-5 steps with a railing? 4  -ML 3  -EK    To walk in hospital room? 4  -ML 3  -EK    AM-PAC 6 Clicks Score (PT) 24  -ML 20  -EK    Highest level of mobility 8 --> Walked 250 feet or more  -ML 6 --> Walked 10 steps or more  -EK    Row Name 12/19/22 1120          Functional Assessment    Outcome  Measure Options AM-PAC 6 Clicks Basic Mobility (PT)  -           User Key  (r) = Recorded By, (t) = Taken By, (c) = Cosigned By    Initials Name Provider Type    Anna Douglass Physical Therapist    Aida Adamson, RN Registered Nurse              Physical Therapy Education     Title: PT OT SLP Therapies (Done)     Topic: Physical Therapy (Done)     Point: Mobility training (Done)     Learning Progress Summary           Patient Acceptance, E, VU by  at 12/19/2022 1121                               User Key     Initials Effective Dates Name Provider Type Discipline     04/22/21 -  Anna Deleon Physical Therapist PT              PT Recommendation and Plan     Plan of Care Reviewed With: patient  Outcome Evaluation: Physical therapy evaluation complete. The patient presents near baseline for functional mobility. Patient independent with transfers and ambulation. No acute PT needs identified. At hospital discharge recommend patient return home.     Time Calculation:    PT Charges     Row Name 12/19/22 1121             Time Calculation    Start Time 1050  -ML      PT Received On 12/19/22  -ML         Untimed Charges    PT Eval/Re-eval Minutes 31  -ML         Total Minutes    Untimed Charges Total Minutes 31  -ML       Total Minutes 31  -ML            User Key  (r) = Recorded By, (t) = Taken By, (c) = Cosigned By    Initials Name Provider Type    Anna Douglass Physical Therapist              Therapy Charges for Today     Code Description Service Date Service Provider Modifiers Qty    23026857381 HC PT EVAL LOW COMPLEXITY 3 12/19/2022 Anna Deleon GP 1          PT G-Codes  Outcome Measure Options: AM-PAC 6 Clicks Basic Mobility (PT)  AM-PAC 6 Clicks Score (PT): 24    PT Discharge Summary  Anticipated Discharge Disposition (PT): home    Anna Deleon  12/19/2022

## 2022-12-19 NOTE — DISCHARGE SUMMARY
[facility] Medicine Services  DISCHARGE SUMMARY    Patient Name: Julian Rodriguez  : 1965  MRN: [id number]    Date of Admission: 2022  1:38 PM  Date of Discharge:  22  Primary Care Physician: Aaliyah Dawson MD    Consults     Date and Time Order Name Status Description    2022  5:22 PM Inpatient Gastroenterology Consult Completed           Hospital Course     Presenting Problem:   Influenza A [J10.1]    Active Hospital Problems    Diagnosis  POA   • Chronic anemia [D64.9]  Yes   • Hypertension [I10]  Yes   • Prostate cancer (HCC) [C61]  Yes   • Diabetes mellitus (HCC) [E11.9]  Yes      Resolved Hospital Problems    Diagnosis Date Resolved POA   • **Influenza A [J10.1] 2022 Yes   • Hypomagnesemia [E83.42] 2022 Yes   • Nausea and vomiting [R11.2] 2022 Yes          Hospital Course:  Julian Rodriguez is a 57 y.o. male with PMH significant for prostate cancer (diagnosis and radical prostatectomy , metastases to retroperitoneum and lymph nodes 2022, follows at [facility]), suspected gastroparesis, duodenitis, HTN, HLD, and DM2 who presents to [facility] ED via EMS for severe weakness, fevers/chills, and inability to keep any food down.  Patient follows with GI at [facility] for ongoing N/V since 2022 when he started new chemo med, relugolix (Orgovyx). He is followed by Oncology at [facility]. Oncologist has told him he does not think chemo med is cause of N/V.    Influenza A  Generalized weakness  -Reports exposure to grandson who has the flu  -s/p 2 doses Tamiflu, now on hold as patient has not been able to keep food or meds down - symptoms improving, will not continue at discharge  -Supportive care  -PT/OT recommend home     Nausea and vomiting, chronic  Nausea and vomiting, intractable x 5 days  Suspected gastroparesis  Duodenitis  Possible pancreatic insufficiency?  -Labs mostly unremarkable, lactate within normal limits  -EGD with  [facility] GI showed duodenitis, continue PPI and Carafate  -Small-bowel follow-through and gastric emptying study done with [facility] GI November 2022 demonstrated prolonged emptying of the stomach, was started on Reglan   -Reglan apparently did not help, so was recently prescribed Creon which he states his pharmacy won't have in stock until 12/19/22 - Creon started here and patient does report significant improvement, he was encouraged to  the prescription   -Patient notes vomiting started July 2022 with new chemo drug relugolix (Orgovyx), but his oncologist does not think medication is causing the N/V, had recommended GI workup   -Per patient, last colonoscopy 5 years ago  -Received IV fluids and Zofran   -Continue PPI, Carafate  -Continue to hold Reglan  -GI consulted, Dr. Brunner performed EGD 12/19/22 which showed his previous duodenitis had healed  -MRI pancreas protocol obtained per GI, spoke with radiologist, no pancreatic abnormalities noted     Hypomagnesemia  -Mg 1.4  -Received replacement per protocol     Metastatic prostate cancer  Anemia  -Has followed at [facility] since 2017  -s/p radical prostatectomy 2017  -July 2022 imaging showed retroperitoneal metastases, janny mets  -Repeat imaging and lab work scheduled at [facility] 1/9/23  -On relugolix (Orgovyx) 120 mg daily, continue     HTN  HLD  -Resume Lisinopril/HCTZ, watch BP closely as outpatient  -Continue statin     DM2  -HbA1c 6.1%, 12/17/22  -Resume metformin, glipizide     Depression/anxiety  -Continue Lexapro     Discharge Follow Up Recommendations for outpatient labs/diagnostics:  F/U with PCP in 1 week  F/U with GI at [facility] as scheduled 1/6/23  F/U at [facility] 1/9/23     Day of Discharge     HPI:   Patient seen this morning, back from EGD. Feeling so much better. Eating well, no pain.    Review of Systems  Gen-no fevers, no chills  CV-no chest pain, no palpitations  Resp-no cough, no dyspnea  GI-no N/V/D,  no abd pain    All other systems reviewed and negative except any additional pertinent positives and negatives as discussed in HPI.      Vital Signs:   Temp:  [97.2 °F (36.2 °C)-99 °F (37.2 °C)] 99 °F (37.2 °C)  Heart Rate:  [54-71] 71  Resp:  [18-22] 18  BP: ()/(54-89) 122/72      Physical Exam:  Gen-no acute distress  HENT-NCAT, mucous membranes moist  CV-RRR, S1 S2 normal, no m/r/g  Resp-CTAB, no wheezes or rales  Abd-soft, NT, ND, +BS  Ext-no edema  Neuro-A&Ox3, no focal deficits  Skin-no rashes  Psych-appropriate mood      Pertinent  and/or Most Recent Results     LAB RESULTS:      Lab 12/18/22  0322 12/17/22  1518   WBC 4.72 5.90   HEMOGLOBIN 10.4* 10.4*   HEMATOCRIT 30.6* 30.5*   PLATELETS 147 150   NEUTROS ABS  --  3.60   IMMATURE GRANS (ABS)  --  0.01   LYMPHS ABS  --  1.61   MONOS ABS  --  0.65   EOS ABS  --  0.00   MCV 96.5 96.2   LACTATE  --  0.8         Lab 12/18/22  0322 12/17/22  1518   SODIUM 137 133*   POTASSIUM 3.8 4.0   CHLORIDE 103 99   CO2 26.0 26.0   ANION GAP 8.0 8.0   BUN 8 9   CREATININE 0.79 0.85   EGFR 102.3 100.1   GLUCOSE 138* 146*   CALCIUM 8.7 9.3   MAGNESIUM 2.3 1.4*   HEMOGLOBIN A1C  --  6.10*         Lab 12/17/22  1518   TOTAL PROTEIN 7.8   ALBUMIN 3.80   GLOBULIN 4.0   ALT (SGPT) 36   AST (SGOT) 32   BILIRUBIN 0.6   ALK PHOS 106   AMYLASE 48   LIPASE 36         Lab 12/17/22  1518   TROPONIN T <0.010                 Brief Urine Lab Results  (Last result in the past 365 days)      Color   Clarity   Blood   Leuk Est   Nitrite   Protein   CREAT   Urine HCG        12/17/22 1515 Yellow   Cloudy   Negative   Negative   Negative   30 mg/dL (1+)               Microbiology Results (last 10 days)     Procedure Component Value - Date/Time    COVID PRE-OP / PRE-PROCEDURE SCREENING ORDER (NO ISOLATION) - Swab, Nasopharynx [[id number]]  (Abnormal) Collected: 12/17/22 1516    Lab Status: Final result Specimen: Swab from Nasopharynx Updated: 12/17/22 7802    Narrative:      The following  orders were created for panel order COVID PRE-OP / PRE-PROCEDURE SCREENING ORDER (NO ISOLATION) - Swab, Nasopharynx.  Procedure                               Abnormality         Status                     ---------                               -----------         ------                     COVID-19 and FLU A/B PCR...[[id number]]  Abnormal            Final result                 Please view results for these tests on the individual orders.    COVID-19 and FLU A/B PCR - Swab, Nasopharynx [[id number]]  (Abnormal) Collected: 12/17/22 1516    Lab Status: Final result Specimen: Swab from Nasopharynx Updated: 12/17/22 1552     COVID19 Not Detected     Influenza A PCR Detected     Influenza B PCR Not Detected    Narrative:      Fact sheet for providers: https://www.fda.gov/media/987170/download    Fact sheet for patients: https://www.fda.gov/media/757536/download    Test performed by PCR.          XR Chest 1 View    Result Date: 12/17/2022  DATE OF EXAM: 12/17/2022 1:53 PM  PROCEDURE: XR CHEST 1 VW-  INDICATIONS: Weak/Dizzy/AMS triage protocol  COMPARISON: Chest x-ray 03/06/2020  TECHNIQUE: Single radiographic view of the chest was obtained.  FINDINGS: Lungs are normally expanded. Heart size is normal. No pneumothorax or pleural effusion or focal pulmonary parenchymal opacity. Pulmonary vessels are distinct. Bones and soft tissues are normal.      No acute cardiopulmonary abnormality.  This report was finalized on 12/17/2022 2:06 PM by Maria Esther Valencia MD.              Pending Labs     Order Current Status    Pancreatic Elastase, Fecal - Stool, Per Rectum In process    Stool Culture (Reference Lab) - Stool, Per Rectum In process        Discharge Details        Discharge Medications      New Medications      Instructions Start Date   omeprazole 40 MG capsule  Commonly known as: priLOSEC   40 mg, Oral, Daily      Pancrelipase (Lip-Prot-Amyl) 33020-569362 units capsule delayed-release particles capsule  Commonly known as: CREON    36,000 units of lipase, Oral, 3 Times Daily With Meals      relugolix 120 MG tablet tablet  Commonly known as: ORGOVYX   120 mg, Oral, Daily   Start Date: December 20, 2022     sucralfate 1 g tablet  Commonly known as: CARAFATE   1 g, Oral, 4 Times Daily Before Meals & Nightly         Continue These Medications      Instructions Start Date   aspirin 81 MG EC tablet   81 mg, Oral, Daily      atorvastatin 10 MG tablet  Commonly known as: LIPITOR   10 mg, Oral, Daily      escitalopram 20 MG tablet  Commonly known as: LEXAPRO   20 mg, Oral, Daily      fish oil 1000 MG capsule capsule   1,000 mg, Oral      glipizide 2.5 MG 24 hr tablet  Commonly known as: GLUCOTROL XL   No dose, route, or frequency recorded.      lisinopril-hydrochlorothiazide 10-12.5 MG per tablet  Commonly known as: PRINZIDE,ZESTORETIC   1 tablet, Oral, Daily      metFORMIN 1000 MG tablet  Commonly known as: GLUCOPHAGE   1,000 mg, Oral, 2 Times Daily         Stop These Medications    meloxicam 15 MG tablet  Commonly known as: MOBIC            No Known Allergies      Discharge Disposition:  Home or Self Care    Diet:  Hospital:  Diet Order   Procedures   • Diet: Regular/House Diet; Texture: Regular Texture (IDDSI 7); Fluid Consistency: Thin (IDDSI 0)       Activity:  Activity Instructions     Activity as Tolerated                 CODE STATUS:    Code Status and Medical Interventions:   Ordered at: 12/17/22 1720     Level Of Support Discussed With:    Patient     Code Status (Patient has no pulse and is not breathing):    CPR (Attempt to Resuscitate)     Medical Interventions (Patient has pulse or is breathing):    Full Support       No future appointments.    Additional Instructions for the Follow-ups that You Need to Schedule     Discharge Follow-up with PCP   As directed       Currently Documented PCP:    Aaliyah Dawson MD    PCP Phone Number:    [phone]     Follow Up Details: 1 week         Discharge Follow-up with Specified Provider: your  primary GI doctor at [facility] as scheduled   As directed      To: your primary GI doctor at [facility] as scheduled                     Alycia Rodrigez MD  12/19/22      Time Spent on Discharge:  I spent  35 minutes on this discharge activity which included: face-to-face encounter with the patient, reviewing the data in the system, coordination of the care with the nursing staff as well as consultants, documentation, and entering orders.

## 2022-12-19 NOTE — BRIEF OP NOTE
ESOPHAGOGASTRODUODENOSCOPY  Progress Note    Julian Keeping  12/19/2022    EGD is normal.  Prior reported duodenitis has healed.    Patient reports improvement in symptoms after addition of Creon during this admission.    >> Continue Creon.    Mark I. Brunner, MD     Date: 12/19/2022  Time: 09:00 EST

## 2022-12-19 NOTE — PLAN OF CARE
Goal Outcome Evaluation:  Plan of Care Reviewed With: patient           Outcome Evaluation: Physical therapy evaluation complete. The patient presents near baseline for functional mobility. Patient independent with transfers and ambulation. No acute PT needs identified. At hospital discharge recommend patient return home.

## 2022-12-19 NOTE — ANESTHESIA PREPROCEDURE EVALUATION
Anesthesia Evaluation     Patient summary reviewed and Nursing notes reviewed   no history of anesthetic complications:  NPO Solid Status: > 8 hours  NPO Liquid Status: > 2 hours           Airway   Mallampati: II  TM distance: >3 FB  Neck ROM: full  No difficulty expected  Dental - normal exam     Pulmonary - negative pulmonary ROS and normal exam    breath sounds clear to auscultation  Cardiovascular - normal exam    ECG reviewed  Rhythm: regular  Rate: normal    (+) hypertension,       Neuro/Psych- negative ROS  GI/Hepatic/Renal/Endo    (+)   diabetes mellitus type 2,     Musculoskeletal (-) negative ROS    Abdominal    Substance History      OB/GYN          Other      history of cancer (Metastatic prostate ca, current chemo)                    Anesthesia Plan    ASA 3     general     intravenous induction     Anesthetic plan, risks, benefits, and alternatives have been provided, discussed and informed consent has been obtained with: patient.    Plan discussed with CRNA.        CODE STATUS:    Level Of Support Discussed With: Patient  Code Status (Patient has no pulse and is not breathing): CPR (Attempt to Resuscitate)  Medical Interventions (Patient has pulse or is breathing): Full Support

## 2022-12-19 NOTE — ANESTHESIA POSTPROCEDURE EVALUATION
Patient: Julian Rodriguez    Procedure Summary     Date: 12/19/22 Room / Location:  [facility]    Anesthesia Start: 0840 Anesthesia Stop: 0856    Procedure: ESOPHAGOGASTRODUODENOSCOPY Diagnosis:     Surgeons: Brunner, Mark I, MD Provider: Walter Shipman MD    Anesthesia Type: general ASA Status: 3          Anesthesia Type: general    Vitals  No vitals data found for the desired time range.          Post Anesthesia Care and Evaluation    Patient location during evaluation: PACU  Patient participation: complete - patient participated  Level of consciousness: awake  Pain score: 0  Pain management: adequate    Airway patency: patent  Anesthetic complications: No anesthetic complications  PONV Status: none  Cardiovascular status: acceptable and hemodynamically stable  Respiratory status: acceptable and nasal cannula  Hydration status: acceptable    Comments: BP:99/53  HR:56  RR:18  SpO2: 99% RA  Temp:97.2    No anesthesia care post op

## 2022-12-19 NOTE — PLAN OF CARE
Goal Outcome Evaluation:  Plan of Care Reviewed With: patient, spouse        Progress: improving  Outcome Evaluation: meal tray eaten and tolerated well. up ad татьяна for brp and states is increasing in strength. has remained NPO for procedure today. pt aware of procedure and to maintain an NPO status.

## 2022-12-20 ENCOUNTER — TELEPHONE (OUTPATIENT)
Dept: GASTROENTEROLOGY | Facility: CLINIC | Age: 57
End: 2022-12-20

## 2022-12-20 NOTE — TELEPHONE ENCOUNTER
PATIENT CALLING IN REGARDS TO HOSPITAL ENCOUNTER WITH DR. BRUNNER.    PATIENT HAS REMAINING QUESTIONS ABOUT POST-DISCHARGE INSTRUCTIONS AS IT RELATES TO HIS MEDICATION OMEPRAZOLE AND SUCRALFATE.    MESSAGE FORWARDED TO PROPER PERSONNEL FOR HANDLING.  DAA 12/20.

## 2022-12-21 NOTE — TELEPHONE ENCOUNTER
RECEIVED RESPONSE IN REGARDS TO PATIENT'S QUESTIONS AND ATTEMPTED OUTBOUND CALL TO DISCUSS.    PHONE KEPT RINGING UNTIL DISCONNECTED. UNABLE TO CONTACT PATIENT OR LEAVE VOICEMAIL. WILL ATTEMPT AGAIN.    PATIENT HAD QUESTION ABOUT OMEPRAZOLE/PRILOSEC:  DR. FERNANDES WAS THE PRESCRIBER FOR THIS MEDICATION. WE CANNOT SPEAK ON BEHALF OF DR. FERNANDES FOR WHY THIS WAS PRESCRIBED. THE BEST METHOD MOVING FORWARD IS FOR PATIENT TO DISCUSS DISCHARGE SUMMARY WITH PRIMARY CARE PROVIDER OR WITH ONCOLOGIST.    PATIENT HAD QUESTION ABOUT SUCRALFATE:  DR. BRUNNER PRESCRIBED SUCRALFATE FOR NAUSEA AND VOMITING, WHICH WAS ONE OF THE PATIENT'S CONCERNS. DR. BRUNNER THEN PERFORMED AN EGD TO RULE OUT A POTENTIAL SOURCE FOR THE NAUSEA THE PATIENT WAS EXPERIENCING. THE EGD CAME BACK CLEAR WITH NO CAUSE FOR CONCERN. THE PATIENT IS CURIOUS WHY HE IS STILL ON AN ANTI NAUSEA MEDICINE - FOR WHICH IT IS LIKELY DUE TO PATIENT'S NEW CHEMO MEDICATIONS (WHICH CAN CAUSE NAUSEA). DR. BRUNNER RECOMMENDED CONTINUATION OF CREON. - AGAIN, QUESTIONS FOR THE HOSPITAL VISIT SHOULD BE FOLLOWED UP WITH PCP OR ONCOLOGIST.

## 2022-12-23 LAB
BACTERIA SPEC CULT: NORMAL
BACTERIA SPEC CULT: NORMAL
CAMPYLOBACTER STL CULT: NORMAL
E COLI SXT STL QL IA: NEGATIVE
ELASTASE PANC STL-MCNT: <50 UG ELAST./G
SALM + SHIG STL CULT: NORMAL

## 2023-10-10 ENCOUNTER — APPOINTMENT (OUTPATIENT)
Dept: GENERAL RADIOLOGY | Facility: HOSPITAL | Age: 58
DRG: 871 | End: 2023-10-10
Payer: MEDICARE

## 2023-10-10 ENCOUNTER — HOSPITAL ENCOUNTER (INPATIENT)
Facility: HOSPITAL | Age: 58
LOS: 1 days | Discharge: HOME | DRG: 871 | End: 2023-10-12
Attending: EMERGENCY MEDICINE | Admitting: HOSPITALIST
Payer: MEDICARE

## 2023-10-10 ENCOUNTER — APPOINTMENT (OUTPATIENT)
Dept: CT IMAGING | Facility: HOSPITAL | Age: 58
DRG: 871 | End: 2023-10-10
Payer: MEDICARE

## 2023-10-10 DIAGNOSIS — A41.9 SEPSIS, DUE TO UNSPECIFIED ORGANISM, UNSPECIFIED WHETHER ACUTE ORGAN DYSFUNCTION PRESENT: Primary | ICD-10-CM

## 2023-10-10 DIAGNOSIS — Z85.46 HISTORY OF PROSTATE CANCER: ICD-10-CM

## 2023-10-10 DIAGNOSIS — D84.9 IMMUNOCOMPROMISED: ICD-10-CM

## 2023-10-10 DIAGNOSIS — J18.9 PNEUMONIA OF RIGHT LUNG DUE TO INFECTIOUS ORGANISM, UNSPECIFIED PART OF LUNG: ICD-10-CM

## 2023-10-10 DIAGNOSIS — Z02.89 REFERRED BY HEALTH CARE PROFESSIONAL: ICD-10-CM

## 2023-10-10 PROBLEM — R11.0 CHRONIC NAUSEA: Status: ACTIVE | Noted: 2022-12-17

## 2023-10-10 LAB
ALBUMIN SERPL-MCNC: 3.7 G/DL (ref 3.5–5.2)
ALBUMIN/GLOB SERPL: 0.7 G/DL
ALP SERPL-CCNC: 82 U/L (ref 39–117)
ALT SERPL W P-5'-P-CCNC: 27 U/L (ref 1–41)
ANION GAP SERPL CALCULATED.3IONS-SCNC: 11 MMOL/L (ref 5–15)
AST SERPL-CCNC: 32 U/L (ref 1–40)
B PARAPERT DNA SPEC QL NAA+PROBE: NOT DETECTED
B PERT DNA SPEC QL NAA+PROBE: NOT DETECTED
BACTERIA UR QL AUTO: NORMAL /HPF
BASOPHILS # BLD AUTO: 0.02 10*3/MM3 (ref 0–0.2)
BASOPHILS NFR BLD AUTO: 0.1 % (ref 0–1.5)
BILIRUB SERPL-MCNC: 1.7 MG/DL (ref 0–1.2)
BILIRUB UR QL STRIP: NEGATIVE
BUN SERPL-MCNC: 15 MG/DL (ref 8–23)
BUN/CREAT SERPL: 14.4 (ref 7–25)
C PNEUM DNA NPH QL NAA+NON-PROBE: NOT DETECTED
CALCIUM SPEC-SCNC: 9.2 MG/DL (ref 8.6–10.5)
CHLORIDE SERPL-SCNC: 96 MMOL/L (ref 98–107)
CLARITY UR: CLEAR
CO2 SERPL-SCNC: 25 MMOL/L (ref 22–29)
COLOR UR: YELLOW
CREAT SERPL-MCNC: 1.04 MG/DL (ref 0.76–1.27)
D-LACTATE SERPL-SCNC: 2.1 MMOL/L (ref 0.5–2)
DEPRECATED RDW RBC AUTO: 45.1 FL (ref 37–54)
EGFRCR SERPLBLD CKD-EPI 2021: 82.2 ML/MIN/1.73
EOSINOPHIL # BLD AUTO: 0 10*3/MM3 (ref 0–0.4)
EOSINOPHIL NFR BLD AUTO: 0 % (ref 0.3–6.2)
ERYTHROCYTE [DISTWIDTH] IN BLOOD BY AUTOMATED COUNT: 12.9 % (ref 12.3–15.4)
FLUAV SUBTYP SPEC NAA+PROBE: NOT DETECTED
FLUBV RNA ISLT QL NAA+PROBE: NOT DETECTED
GLOBULIN UR ELPH-MCNC: 5.3 GM/DL
GLUCOSE BLDC GLUCOMTR-MCNC: 139 MG/DL (ref 70–130)
GLUCOSE BLDC GLUCOMTR-MCNC: 222 MG/DL (ref 70–130)
GLUCOSE SERPL-MCNC: 201 MG/DL (ref 65–99)
GLUCOSE UR STRIP-MCNC: NEGATIVE MG/DL
HADV DNA SPEC NAA+PROBE: NOT DETECTED
HBA1C MFR BLD: 6.6 % (ref 4.8–5.6)
HCOV 229E RNA SPEC QL NAA+PROBE: NOT DETECTED
HCOV HKU1 RNA SPEC QL NAA+PROBE: NOT DETECTED
HCOV NL63 RNA SPEC QL NAA+PROBE: NOT DETECTED
HCOV OC43 RNA SPEC QL NAA+PROBE: NOT DETECTED
HCT VFR BLD AUTO: 32.3 % (ref 37.5–51)
HGB BLD-MCNC: 11.4 G/DL (ref 13–17.7)
HGB UR QL STRIP.AUTO: ABNORMAL
HMPV RNA NPH QL NAA+NON-PROBE: NOT DETECTED
HOLD SPECIMEN: NORMAL
HPIV1 RNA ISLT QL NAA+PROBE: NOT DETECTED
HPIV2 RNA SPEC QL NAA+PROBE: NOT DETECTED
HPIV3 RNA NPH QL NAA+PROBE: NOT DETECTED
HPIV4 P GENE NPH QL NAA+PROBE: NOT DETECTED
HYALINE CASTS UR QL AUTO: NORMAL /LPF
IMM GRANULOCYTES # BLD AUTO: 0.34 10*3/MM3 (ref 0–0.05)
IMM GRANULOCYTES NFR BLD AUTO: 2.2 % (ref 0–0.5)
KETONES UR QL STRIP: NEGATIVE
LEUKOCYTE ESTERASE UR QL STRIP.AUTO: NEGATIVE
LYMPHOCYTES # BLD AUTO: 0.71 10*3/MM3 (ref 0.7–3.1)
LYMPHOCYTES NFR BLD AUTO: 4.6 % (ref 19.6–45.3)
M PNEUMO IGG SER IA-ACNC: NOT DETECTED
MAGNESIUM SERPL-MCNC: 1.8 MG/DL (ref 1.6–2.4)
MCH RBC QN AUTO: 33.4 PG (ref 26.6–33)
MCHC RBC AUTO-ENTMCNC: 35.3 G/DL (ref 31.5–35.7)
MCV RBC AUTO: 94.7 FL (ref 79–97)
MONOCYTES # BLD AUTO: 0.45 10*3/MM3 (ref 0.1–0.9)
MONOCYTES NFR BLD AUTO: 2.9 % (ref 5–12)
MRSA DNA SPEC QL NAA+PROBE: NEGATIVE
NEUTROPHILS NFR BLD AUTO: 14.02 10*3/MM3 (ref 1.7–7)
NEUTROPHILS NFR BLD AUTO: 90.2 % (ref 42.7–76)
NITRITE UR QL STRIP: NEGATIVE
NRBC BLD AUTO-RTO: 0 /100 WBC (ref 0–0.2)
PH UR STRIP.AUTO: 5.5 [PH] (ref 5–8)
PLATELET # BLD AUTO: 160 10*3/MM3 (ref 140–450)
PMV BLD AUTO: 10 FL (ref 6–12)
POTASSIUM SERPL-SCNC: 4 MMOL/L (ref 3.5–5.2)
PROCALCITONIN SERPL-MCNC: 6.45 NG/ML (ref 0–0.25)
PROT SERPL-MCNC: 9 G/DL (ref 6–8.5)
PROT UR QL STRIP: ABNORMAL
QT INTERVAL: 392 MS
QTC INTERVAL: 449 MS
RBC # BLD AUTO: 3.41 10*6/MM3 (ref 4.14–5.8)
RBC # UR STRIP: NORMAL /HPF
REF LAB TEST METHOD: NORMAL
RHINOVIRUS RNA SPEC NAA+PROBE: DETECTED
RSV RNA NPH QL NAA+NON-PROBE: NOT DETECTED
SARS-COV-2 RNA NPH QL NAA+NON-PROBE: NOT DETECTED
SODIUM SERPL-SCNC: 132 MMOL/L (ref 136–145)
SP GR UR STRIP: 1.02 (ref 1–1.03)
SQUAMOUS #/AREA URNS HPF: NORMAL /HPF
TROPONIN T SERPL HS-MCNC: 12 NG/L
UROBILINOGEN UR QL STRIP: ABNORMAL
WBC # UR STRIP: NORMAL /HPF
WBC NRBC COR # BLD: 15.54 10*3/MM3 (ref 3.4–10.8)
WHOLE BLOOD HOLD COAG: NORMAL
WHOLE BLOOD HOLD SPECIMEN: NORMAL

## 2023-10-10 PROCEDURE — 25810000003 LACTATED RINGERS PER 1000 ML: Performed by: INTERNAL MEDICINE

## 2023-10-10 PROCEDURE — 87077 CULTURE AEROBIC IDENTIFY: CPT | Performed by: NURSE PRACTITIONER

## 2023-10-10 PROCEDURE — 83605 ASSAY OF LACTIC ACID: CPT | Performed by: NURSE PRACTITIONER

## 2023-10-10 PROCEDURE — 25810000003 SODIUM CHLORIDE 0.9 % SOLUTION: Performed by: NURSE PRACTITIONER

## 2023-10-10 PROCEDURE — 25010000002 PIPERACILLIN SOD-TAZOBACTAM PER 1 G: Performed by: INTERNAL MEDICINE

## 2023-10-10 PROCEDURE — 25010000002 HYDROMORPHONE PER 4 MG: Performed by: EMERGENCY MEDICINE

## 2023-10-10 PROCEDURE — 87040 BLOOD CULTURE FOR BACTERIA: CPT | Performed by: NURSE PRACTITIONER

## 2023-10-10 PROCEDURE — 25810000003 LACTATED RINGERS SOLUTION: Performed by: INTERNAL MEDICINE

## 2023-10-10 PROCEDURE — 36415 COLL VENOUS BLD VENIPUNCTURE: CPT

## 2023-10-10 PROCEDURE — 87186 SC STD MICRODIL/AGAR DIL: CPT | Performed by: NURSE PRACTITIONER

## 2023-10-10 PROCEDURE — 25010000002 PROCHLORPERAZINE 10 MG/2ML SOLUTION: Performed by: EMERGENCY MEDICINE

## 2023-10-10 PROCEDURE — 0202U NFCT DS 22 TRGT SARS-COV-2: CPT | Performed by: NURSE PRACTITIONER

## 2023-10-10 PROCEDURE — 93005 ELECTROCARDIOGRAM TRACING: CPT | Performed by: EMERGENCY MEDICINE

## 2023-10-10 PROCEDURE — G0378 HOSPITAL OBSERVATION PER HR: HCPCS

## 2023-10-10 PROCEDURE — 74177 CT ABD & PELVIS W/CONTRAST: CPT

## 2023-10-10 PROCEDURE — 25010000002 HYDROMORPHONE PER 4 MG: Performed by: INTERNAL MEDICINE

## 2023-10-10 PROCEDURE — 80053 COMPREHEN METABOLIC PANEL: CPT | Performed by: EMERGENCY MEDICINE

## 2023-10-10 PROCEDURE — 83735 ASSAY OF MAGNESIUM: CPT | Performed by: EMERGENCY MEDICINE

## 2023-10-10 PROCEDURE — 83036 HEMOGLOBIN GLYCOSYLATED A1C: CPT | Performed by: INTERNAL MEDICINE

## 2023-10-10 PROCEDURE — 25510000001 IOPAMIDOL PER 1 ML: Performed by: EMERGENCY MEDICINE

## 2023-10-10 PROCEDURE — 99285 EMERGENCY DEPT VISIT HI MDM: CPT

## 2023-10-10 PROCEDURE — 85025 COMPLETE CBC W/AUTO DIFF WBC: CPT | Performed by: EMERGENCY MEDICINE

## 2023-10-10 PROCEDURE — 87641 MR-STAPH DNA AMP PROBE: CPT | Performed by: INTERNAL MEDICINE

## 2023-10-10 PROCEDURE — 84484 ASSAY OF TROPONIN QUANT: CPT | Performed by: EMERGENCY MEDICINE

## 2023-10-10 PROCEDURE — 71045 X-RAY EXAM CHEST 1 VIEW: CPT

## 2023-10-10 PROCEDURE — 87150 DNA/RNA AMPLIFIED PROBE: CPT | Performed by: NURSE PRACTITIONER

## 2023-10-10 PROCEDURE — 81001 URINALYSIS AUTO W/SCOPE: CPT | Performed by: EMERGENCY MEDICINE

## 2023-10-10 PROCEDURE — 25010000002 PIPERACILLIN SOD-TAZOBACTAM PER 1 G: Performed by: NURSE PRACTITIONER

## 2023-10-10 PROCEDURE — 63710000001 INSULIN LISPRO (HUMAN) PER 5 UNITS: Performed by: INTERNAL MEDICINE

## 2023-10-10 PROCEDURE — 82948 REAGENT STRIP/BLOOD GLUCOSE: CPT

## 2023-10-10 PROCEDURE — 84145 PROCALCITONIN (PCT): CPT | Performed by: NURSE PRACTITIONER

## 2023-10-10 PROCEDURE — 71275 CT ANGIOGRAPHY CHEST: CPT

## 2023-10-10 PROCEDURE — 25010000002 ENOXAPARIN PER 10 MG: Performed by: INTERNAL MEDICINE

## 2023-10-10 RX ORDER — HYDROCODONE BITARTRATE AND ACETAMINOPHEN 5; 325 MG/1; MG/1
1 TABLET ORAL EVERY 4 HOURS PRN
Status: DISCONTINUED | OUTPATIENT
Start: 2023-10-10 | End: 2023-10-12 | Stop reason: HOSPADM

## 2023-10-10 RX ORDER — SODIUM CHLORIDE 0.9 % (FLUSH) 0.9 %
10 SYRINGE (ML) INJECTION AS NEEDED
Status: DISCONTINUED | OUTPATIENT
Start: 2023-10-10 | End: 2023-10-12 | Stop reason: HOSPADM

## 2023-10-10 RX ORDER — BISACODYL 5 MG/1
5 TABLET, DELAYED RELEASE ORAL DAILY PRN
Status: DISCONTINUED | OUTPATIENT
Start: 2023-10-10 | End: 2023-10-12 | Stop reason: HOSPADM

## 2023-10-10 RX ORDER — ENOXAPARIN SODIUM 100 MG/ML
40 INJECTION SUBCUTANEOUS DAILY
Status: DISCONTINUED | OUTPATIENT
Start: 2023-10-10 | End: 2023-10-12 | Stop reason: HOSPADM

## 2023-10-10 RX ORDER — INSULIN LISPRO 100 [IU]/ML
2-7 INJECTION, SOLUTION INTRAVENOUS; SUBCUTANEOUS
Status: DISCONTINUED | OUTPATIENT
Start: 2023-10-10 | End: 2023-10-12 | Stop reason: HOSPADM

## 2023-10-10 RX ORDER — HYDROMORPHONE HYDROCHLORIDE 1 MG/ML
0.5 INJECTION, SOLUTION INTRAMUSCULAR; INTRAVENOUS; SUBCUTANEOUS ONCE
Status: COMPLETED | OUTPATIENT
Start: 2023-10-10 | End: 2023-10-10

## 2023-10-10 RX ORDER — NALOXONE HCL 0.4 MG/ML
0.4 VIAL (ML) INJECTION
Status: DISCONTINUED | OUTPATIENT
Start: 2023-10-10 | End: 2023-10-12 | Stop reason: HOSPADM

## 2023-10-10 RX ORDER — AMOXICILLIN 250 MG
2 CAPSULE ORAL 2 TIMES DAILY
Status: DISCONTINUED | OUTPATIENT
Start: 2023-10-10 | End: 2023-10-12 | Stop reason: HOSPADM

## 2023-10-10 RX ORDER — BISACODYL 10 MG
10 SUPPOSITORY, RECTAL RECTAL DAILY PRN
Status: DISCONTINUED | OUTPATIENT
Start: 2023-10-10 | End: 2023-10-12 | Stop reason: HOSPADM

## 2023-10-10 RX ORDER — IBUPROFEN 600 MG/1
1 TABLET ORAL
Status: DISCONTINUED | OUTPATIENT
Start: 2023-10-10 | End: 2023-10-12 | Stop reason: HOSPADM

## 2023-10-10 RX ORDER — SODIUM CHLORIDE 0.9 % (FLUSH) 0.9 %
10 SYRINGE (ML) INJECTION EVERY 12 HOURS SCHEDULED
Status: DISCONTINUED | OUTPATIENT
Start: 2023-10-10 | End: 2023-10-12 | Stop reason: HOSPADM

## 2023-10-10 RX ORDER — ASPIRIN 81 MG/1
81 TABLET ORAL DAILY
Status: DISCONTINUED | OUTPATIENT
Start: 2023-10-11 | End: 2023-10-12 | Stop reason: HOSPADM

## 2023-10-10 RX ORDER — PROCHLORPERAZINE EDISYLATE 5 MG/ML
10 INJECTION INTRAMUSCULAR; INTRAVENOUS ONCE
Status: COMPLETED | OUTPATIENT
Start: 2023-10-10 | End: 2023-10-10

## 2023-10-10 RX ORDER — SODIUM CHLORIDE, SODIUM LACTATE, POTASSIUM CHLORIDE, CALCIUM CHLORIDE 600; 310; 30; 20 MG/100ML; MG/100ML; MG/100ML; MG/100ML
100 INJECTION, SOLUTION INTRAVENOUS CONTINUOUS
Status: DISCONTINUED | OUTPATIENT
Start: 2023-10-10 | End: 2023-10-12 | Stop reason: HOSPADM

## 2023-10-10 RX ORDER — SODIUM CHLORIDE 9 MG/ML
40 INJECTION, SOLUTION INTRAVENOUS AS NEEDED
Status: DISCONTINUED | OUTPATIENT
Start: 2023-10-10 | End: 2023-10-12 | Stop reason: HOSPADM

## 2023-10-10 RX ORDER — ACETAMINOPHEN 325 MG/1
650 TABLET ORAL EVERY 4 HOURS PRN
Status: DISCONTINUED | OUTPATIENT
Start: 2023-10-10 | End: 2023-10-12 | Stop reason: HOSPADM

## 2023-10-10 RX ORDER — DEXTROSE MONOHYDRATE 25 G/50ML
25 INJECTION, SOLUTION INTRAVENOUS
Status: DISCONTINUED | OUTPATIENT
Start: 2023-10-10 | End: 2023-10-12 | Stop reason: HOSPADM

## 2023-10-10 RX ORDER — HYDROMORPHONE HYDROCHLORIDE 1 MG/ML
0.25 INJECTION, SOLUTION INTRAMUSCULAR; INTRAVENOUS; SUBCUTANEOUS EVERY 4 HOURS PRN
Status: DISCONTINUED | OUTPATIENT
Start: 2023-10-10 | End: 2023-10-12 | Stop reason: HOSPADM

## 2023-10-10 RX ORDER — NICOTINE POLACRILEX 4 MG
15 LOZENGE BUCCAL
Status: DISCONTINUED | OUTPATIENT
Start: 2023-10-10 | End: 2023-10-12 | Stop reason: HOSPADM

## 2023-10-10 RX ORDER — ATORVASTATIN CALCIUM 10 MG/1
10 TABLET, FILM COATED ORAL NIGHTLY
Status: DISCONTINUED | OUTPATIENT
Start: 2023-10-10 | End: 2023-10-12 | Stop reason: HOSPADM

## 2023-10-10 RX ORDER — ONDANSETRON 2 MG/ML
4 INJECTION INTRAMUSCULAR; INTRAVENOUS EVERY 6 HOURS PRN
Status: DISCONTINUED | OUTPATIENT
Start: 2023-10-10 | End: 2023-10-12 | Stop reason: HOSPADM

## 2023-10-10 RX ORDER — ONDANSETRON 4 MG/1
4 TABLET, FILM COATED ORAL EVERY 6 HOURS PRN
Status: DISCONTINUED | OUTPATIENT
Start: 2023-10-10 | End: 2023-10-12 | Stop reason: HOSPADM

## 2023-10-10 RX ORDER — POLYETHYLENE GLYCOL 3350 17 G/17G
17 POWDER, FOR SOLUTION ORAL DAILY PRN
Status: DISCONTINUED | OUTPATIENT
Start: 2023-10-10 | End: 2023-10-12 | Stop reason: HOSPADM

## 2023-10-10 RX ADMIN — SODIUM CHLORIDE, POTASSIUM CHLORIDE, SODIUM LACTATE AND CALCIUM CHLORIDE 100 ML/HR: 600; 310; 30; 20 INJECTION, SOLUTION INTRAVENOUS at 17:38

## 2023-10-10 RX ADMIN — SODIUM CHLORIDE, POTASSIUM CHLORIDE, SODIUM LACTATE AND CALCIUM CHLORIDE 1000 ML: 600; 310; 30; 20 INJECTION, SOLUTION INTRAVENOUS at 16:42

## 2023-10-10 RX ADMIN — PROCHLORPERAZINE EDISYLATE 10 MG: 5 INJECTION INTRAMUSCULAR; INTRAVENOUS at 12:57

## 2023-10-10 RX ADMIN — HYDROCODONE BITARTRATE AND ACETAMINOPHEN 1 TABLET: 5; 325 TABLET ORAL at 20:01

## 2023-10-10 RX ADMIN — IOPAMIDOL 85 ML: 755 INJECTION, SOLUTION INTRAVENOUS at 12:05

## 2023-10-10 RX ADMIN — PIPERACILLIN SODIUM AND TAZOBACTAM SODIUM 3.38 G: 3; .375 INJECTION, SOLUTION INTRAVENOUS at 17:36

## 2023-10-10 RX ADMIN — ATORVASTATIN CALCIUM 10 MG: 10 TABLET, FILM COATED ORAL at 20:01

## 2023-10-10 RX ADMIN — PIPERACILLIN SODIUM AND TAZOBACTAM SODIUM 3.38 G: 3; .375 INJECTION, SOLUTION INTRAVENOUS at 12:35

## 2023-10-10 RX ADMIN — SODIUM CHLORIDE 1000 ML: 9 INJECTION, SOLUTION INTRAVENOUS at 11:20

## 2023-10-10 RX ADMIN — SODIUM CHLORIDE 1000 ML: 9 INJECTION, SOLUTION INTRAVENOUS at 13:14

## 2023-10-10 RX ADMIN — INSULIN LISPRO 3 UNITS: 100 INJECTION, SOLUTION INTRAVENOUS; SUBCUTANEOUS at 17:36

## 2023-10-10 RX ADMIN — PANCRELIPASE 36000 UNITS OF LIPASE: 36000; 180000; 114000 CAPSULE, DELAYED RELEASE PELLETS ORAL at 17:36

## 2023-10-10 RX ADMIN — HYDROMORPHONE HYDROCHLORIDE 0.25 MG: 1 INJECTION, SOLUTION INTRAMUSCULAR; INTRAVENOUS; SUBCUTANEOUS at 22:02

## 2023-10-10 RX ADMIN — ENOXAPARIN SODIUM 40 MG: 100 INJECTION SUBCUTANEOUS at 16:28

## 2023-10-10 RX ADMIN — HYDROMORPHONE HYDROCHLORIDE 0.5 MG: 1 INJECTION, SOLUTION INTRAMUSCULAR; INTRAVENOUS; SUBCUTANEOUS at 12:57

## 2023-10-10 RX ADMIN — ACETAMINOPHEN 650 MG: 325 TABLET ORAL at 16:29

## 2023-10-10 NOTE — H&P
"    [facility] Medicine Services  HISTORY AND PHYSICAL    Patient Name: Julian Rodriguez  : 1965  MRN: [id number]  Primary Care Physician: Aaliyah Dawson MD  Date of admission: 10/10/2023      Subjective   Subjective     Chief Complaint:  Nausea and vomiting    HPI:  Julian Rodriguez is a 58 y.o. male w metastatic prostate cancer on GnRH therapy who presented with complaints of nausea vomiting.   Starting Saturday evening, had profound vomiting \"like sludge\" and has persistently vomited since without tolerating PO intake. Having regular bowel movements, not diarrheal. Associated with cough productive of yellow sputum and fevers up to 102 F with associated rigors. Was exposed to granddaughter w RSV this week. Currently very dehydrated, asking for something to drink  Has some chronic nausea/vomiting at baseline ~4 times/week from his chemotherapy pill. Also has nausea/vomiting when that stops. Was worked up last admission here w EGD which had healed duodenitis + improved with PO lipase which patient still takes at home.      Personal History     Past Medical History:   Diagnosis Date    Diabetes mellitus     Hyperlipidemia     Hypertension     Prostate cancer     Umbilical hernia          Oncology Problem List:  Prostate cancer (Status: Active)    Oncology/Hematology History       Past Surgical History:   Procedure Laterality Date    BLADDER SLING MODIFIED, ANTERIOR AND POSTERIOR VAGINAL REPAIR      ENDOSCOPY N/A 2022    Procedure: ESOPHAGOGASTRODUODENOSCOPY;  Surgeon: Brunner, Mark I, MD;  Location: [facility] END[facility];  Service: Gastroenterology;  Laterality: N/A;    PENILE PROSTHESIS IMPLANT      PROSTATECTOMY      ROTATOR CUFF REPAIR         Family History: family history includes Cancer in his father, maternal grandmother, mother, and paternal grandfather.     Social History:  reports that he has never smoked. He has never used smokeless tobacco. He reports that he does " not currently use alcohol after a past usage of about 6.0 standard drinks of alcohol per week. He reports that he does not use drugs.  Social History     Social History Narrative    Not on file       Medications:  Available home medication information reviewed.  (Not in a hospital admission)      No Known Allergies    Objective   Objective     Vital Signs:   Temp:  [98.7 øF (37.1 øC)-100.8 øF (38.2 øC)] 100.8 øF (38.2 øC)  Heart Rate:  [76-86] 82  Resp:  [14-18] 18  BP: (133-140)/(77-82) 138/80       Physical Exam   Constitutional: Awake, alert male lying in bed. Ill appearing. Wife bedside. Some rigors  Eyes: PERRLA, sclerae anicteric, no conjunctival injection  HENT: NCAT, mucous membranes moist  Neck: Supple, no thyromegaly, no lymphadenopathy, trachea midline  Respiratory: Some crackles in right middle lobe, otherwise clear without wheezes, nonlabored respirations on room air  Cardiovascular: Mildly tachycardic, no murmurs, rubs, or gallops, palpable pedal pulses bilaterally. Warm extremities, cap refill <2 sec  Gastrointestinal: Positive bowel sounds, soft, nontender, nondistended  Musculoskeletal: No bilateral ankle edema, no clubbing or cyanosis to extremities  Psychiatric: Appropriate affect, cooperative  Neurologic: Oriented x 3, strength symmetric in all extremities, Cranial Nerves grossly intact to confrontation, speech clear  Skin: No rashes    Result Review:  I have personally reviewed the results from the time of this admission to 10/10/2023 14:33 EDT and agree with these findings:  [x]  Laboratory list / accordion  []  Microbiology  [x]  Radiology: CXR personally reviewed and interpreted: right upper lobe pna. Also present on CTA chest which was personally reviewed  [x]  EKG/Telemetry : EKG personally reviewed and interpreted: nsr wnl  []  Cardiology/Vascular   []  Pathology  []  Old records  []  Other:  Most notable findings include:   Significant right-sided PNA  Meeting sepsis criteria w elevated  WBC        LAB RESULTS:      Lab 10/10/23  1056   WBC 15.54*   HEMOGLOBIN 11.4*   HEMATOCRIT 32.3*   PLATELETS 160   NEUTROS ABS 14.02*   IMMATURE GRANS (ABS) 0.34*   LYMPHS ABS 0.71   MONOS ABS 0.45   EOS ABS 0.00   MCV 94.7   PROCALCITONIN 6.45*   LACTATE 2.1*         Lab 10/10/23  1056   SODIUM 132*   POTASSIUM 4.0   CHLORIDE 96*   CO2 25.0   ANION GAP 11.0   BUN 15   CREATININE 1.04   EGFR 82.2   GLUCOSE 201*   CALCIUM 9.2   MAGNESIUM 1.8         Lab 10/10/23  1056   TOTAL PROTEIN 9.0*   ALBUMIN 3.7   GLOBULIN 5.3   ALT (SGPT) 27   AST (SGOT) 32   BILIRUBIN 1.7*   ALK PHOS 82         Lab 10/10/23  1056   HSTROP T 12                 UA          12/17/2022    15:15 10/10/2023    11:57   Urinalysis   Squamous Epithelial Cells, UA None Seen  None Seen    Specific Harrod, UA 1.013  1.025    Ketones, UA Negative  Negative    Blood, UA Negative  Moderate (2+)    Leukocytes, UA Negative  Negative    Nitrite, UA Negative  Negative    RBC, UA None Seen  0-2    WBC, UA None Seen  0-2    Bacteria, UA None Seen  None Seen        Microbiology Results (last 10 days)       Procedure Component Value - Date/Time    COVID PRE-OP / PRE-PROCEDURE SCREENING ORDER (NO ISOLATION) - Swab, Nasopharynx [[id number]]  (Abnormal) Collected: 10/10/23 1242    Lab Status: Final result Specimen: Swab from Nasopharynx Updated: 10/10/23 9311    Narrative:      The following orders were created for panel order COVID PRE-OP / PRE-PROCEDURE SCREENING ORDER (NO ISOLATION) - Swab, Nasopharynx.  Procedure                               Abnormality         Status                     ---------                               -----------         ------                     Respiratory Panel PCR w/...[[id number]]  Abnormal            Final result                 Please view results for these tests on the individual orders.    Respiratory Panel PCR w/COVID-19(SARS-CoV-2) [facility]-House, NP Swab in UTM/VTM, 3-4 HR TAT - Swab, Nasopharynx  [[id number]]  (Abnormal) Collected: 10/10/23 1242    Lab Status: Final result Specimen: Swab from Nasopharynx Updated: 10/10/23 1976     ADENOVIRUS, PCR Not Detected     Coronavirus 229E Not Detected     Coronavirus HKU1 Not Detected     Coronavirus NL63 Not Detected     Coronavirus OC43 Not Detected     COVID19 Not Detected     Human Metapneumovirus Not Detected     Human Rhinovirus/Enterovirus Detected     Influenza A PCR Not Detected     Influenza B PCR Not Detected     Parainfluenza Virus 1 Not Detected     Parainfluenza Virus 2 Not Detected     Parainfluenza Virus 3 Not Detected     Parainfluenza Virus 4 Not Detected     RSV, PCR Not Detected     Bordetella pertussis pcr Not Detected     Bordetella parapertussis PCR Not Detected     Chlamydophila pneumoniae PCR Not Detected     Mycoplasma pneumo by PCR Not Detected    Narrative:      In the setting of a positive respiratory panel with a viral infection PLUS a negative procalcitonin without other underlying concern for bacterial infection, consider observing off antibiotics or discontinuation of antibiotics and continue supportive care. If the respiratory panel is positive for atypical bacterial infection (Bordetella pertussis, Chlamydophila pneumoniae, or Mycoplasma pneumoniae), consider antibiotic de-escalation to target atypical bacterial infection.            CT Angiogram Chest    Result Date: 10/10/2023  CT ANGIOGRAM CHEST, CT ABDOMEN PELVIS W CONTRAST Date of Exam: 10/10/2023 10:49 AM CDT Indication: pe. lung mass. hx of prostate cancer.. Comparison: CT abdomen pelvis 9/29/2021 Technique: CTA of the chest and CT abdomen and pelvis were performed after the uneventful intravenous administration of 85 cc Isovue-370. Reconstructed coronal and sagittal images were also obtained. In addition, a 3-D volume rendered image was created for interpretation. Automated exposure control and iterative reconstruction methods were used. Findings: CT PULMONARY ANGIOGRAM  PULMONARY VASCULATURE: Pulmonary arteries are widely patent without evidence of embolus.Main pulmonary artery is normal in size. No evidence of right heart strain. MEDIASTINUM:Unremarkable. Aortic and heart size are normal. No aortic dissection identified. No mass nor pericardial effusion. CORONARY ARTERIES: Moderate calcified atherosclerotic disease. LUNGS: There is dense consolidation of the posterior right upper lobe consistent with pneumonia. Mild bronchial wall thickening leading into this region. Lungs are otherwise clear. No nodule identified. PLEURAL SPACE: No effusion, mass, nor pneumothorax. LYMPH NODES: There are no pathologically enlarged lymph nodes. CT ABDOMEN AND PELVIS LIVER:  Unremarkable parenchyma without focal lesion. BILIARY/GALLBLADDER:  Unremarkable SPLEEN:  Unremarkable PANCREAS:  Unremarkable ADRENAL:  Unremarkable KIDNEYS:  Unremarkable parenchyma with no solid mass identified. No obstruction.  No calculus identified. GASTROINTESTINAL/MESENTERY:  No evidence of obstruction nor inflammation.  Colonic diverticulosis. No evidence of acute diverticulitis. The appendix is normal. MESENTERIC VESSELS:  Patent. AORTA/IVC:  Normal caliber. RETROPERITONEUM/LYMPH NODES:  Unremarkable REPRODUCTIVE: Prostatectomy. There is a penile implant. BLADDER:  Unremarkable OSSEUS STRUCTURES: There are multiple spinal compression fractures with previous kyphoplasty of T6. There are likely old right-sided rib fractures. No definite suspicious bony lesion identified. If osseous metastatic disease is suspected, nuclear medicine bone scan might be useful.     Impression: Impression: 1.Posterior right upper lobe pneumonia. 2.No evidence of pulmonary embolism. 3.Other incidental nonemergent findings as detailed above. No definitive metastatic disease identified. Electronically Signed: Uri De Leon MD  10/10/2023 11:22 AM CDT  Workstation ID: [id number]    CT Abdomen Pelvis With Contrast    Result Date: 10/10/2023  CT  ANGIOGRAM CHEST, CT ABDOMEN PELVIS W CONTRAST Date of Exam: 10/10/2023 10:49 AM CDT Indication: pe. lung mass. hx of prostate cancer.. Comparison: CT abdomen pelvis 9/29/2021 Technique: CTA of the chest and CT abdomen and pelvis were performed after the uneventful intravenous administration of 85 cc Isovue-370. Reconstructed coronal and sagittal images were also obtained. In addition, a 3-D volume rendered image was created for interpretation. Automated exposure control and iterative reconstruction methods were used. Findings: CT PULMONARY ANGIOGRAM PULMONARY VASCULATURE: Pulmonary arteries are widely patent without evidence of embolus.Main pulmonary artery is normal in size. No evidence of right heart strain. MEDIASTINUM:Unremarkable. Aortic and heart size are normal. No aortic dissection identified. No mass nor pericardial effusion. CORONARY ARTERIES: Moderate calcified atherosclerotic disease. LUNGS: There is dense consolidation of the posterior right upper lobe consistent with pneumonia. Mild bronchial wall thickening leading into this region. Lungs are otherwise clear. No nodule identified. PLEURAL SPACE: No effusion, mass, nor pneumothorax. LYMPH NODES: There are no pathologically enlarged lymph nodes. CT ABDOMEN AND PELVIS LIVER:  Unremarkable parenchyma without focal lesion. BILIARY/GALLBLADDER:  Unremarkable SPLEEN:  Unremarkable PANCREAS:  Unremarkable ADRENAL:  Unremarkable KIDNEYS:  Unremarkable parenchyma with no solid mass identified. No obstruction.  No calculus identified. GASTROINTESTINAL/MESENTERY:  No evidence of obstruction nor inflammation.  Colonic diverticulosis. No evidence of acute diverticulitis. The appendix is normal. MESENTERIC VESSELS:  Patent. AORTA/IVC:  Normal caliber. RETROPERITONEUM/LYMPH NODES:  Unremarkable REPRODUCTIVE: Prostatectomy. There is a penile implant. BLADDER:  Unremarkable OSSEUS STRUCTURES: There are multiple spinal compression fractures with previous kyphoplasty of  T6. There are likely old right-sided rib fractures. No definite suspicious bony lesion identified. If osseous metastatic disease is suspected, nuclear medicine bone scan might be useful.     Impression: Impression: 1.Posterior right upper lobe pneumonia. 2.No evidence of pulmonary embolism. 3.Other incidental nonemergent findings as detailed above. No definitive metastatic disease identified. Electronically Signed: Uri De Leon MD  10/10/2023 11:22 AM CDT  Workstation ID: [id number]    XR Chest 1 View    Result Date: 10/10/2023  XR CHEST 1 VW Date of Exam: 10/10/2023 10:46 AM EDT Indication: Weak/Dizzy/AMS triage protocol Comparison: 12/17/2022. Findings: There is a new large opacity of the right upper lobe extending into the right hilum. The left lung is clear. The heart size is normal. There are no definite effusions.     Impression: Impression: Large opacity on the right likely relates to pneumonia although underlying mass is not excluded and follow-up exam to clearing recommended. Electronically Signed: Mervat Hooker MD  10/10/2023 11:06 AM EDT  Workstation ID: [id number]         Assessment & Plan   Assessment & Plan     Active Hospital Problems    Diagnosis  POA    **Sepsis [A41.9]  Yes    PNA (pneumonia) [J18.9]  Yes    Chronic nausea [R11.0]  Yes    Prostate cancer [C61]  Yes    Diabetes mellitus [E11.9]  Yes     Julian Rodriguez is a 58 y.o. male w metastatic prostate cancer on GnRH therapy who presented with complaints of nausea vomiting. Found to have significant right upper lobe pneumonia, +rhino/enterovirus but meeting sepsis criteria with elevated procal    Sepsis 2/2 RUL Pneumonia: Rhinovirus + Bacterial Co-Infection  -blood/sputum cultures pending. Given rigors + elev procal, would not be surprised if bacteremic  -zosyn, vancomycin pending MRSA swab (can d/c if negative). Hope to de-escalate soon  -not currently requiring o2    Acute on chronic nausea/vomiting  -CT a/p unremarkable, no s/s of SBO,  benign abdominal exam. Suspect 2/2 above  -chronic n/v worked up last admit w EGD, improved w creon  -clinically dehydrated - s/p 1 liter bolus, repeat, then start continuous IVF  -full liquid diet, advance as able  -IV zofran, home creon w meals    Metastatic prostate cx - continue home relugolix (GnRH antagonist)  DMII - A1c pending, SSI for now, hold metformin    DVT prophylaxis:  lovenox    CODE STATUS:  FULL CODE  Code Status and Medical Interventions:   Ordered at: 10/10/23 1413     Level Of Support Discussed With:    Patient     Code Status (Patient has no pulse and is not breathing):    CPR (Attempt to Resuscitate)     Medical Interventions (Patient has pulse or is breathing):    Full Support     *Patient anxious for home soon but I think pre-test probability is high for bacteremia given presentation which would prolong stay    Expected Discharge 10/12 (Discharge date is tentative pending patient's medical condition and is subject to change)  Expected Discharge Date: 10/12/2023; Expected Discharge Time:      Kirstie Cristina MD  10/10/23

## 2023-10-10 NOTE — ED NOTES
" Julian Keeping    Nursing Report ED to Floor:  Mental status: ALERT AND ORIENTED X4  Ambulatory status: AMBULATORY  Oxygen Therapy:  NONE  Cardiac Rhythm: SINUS  Admitted from: ED ROOM 19  Safety Concerns:  NONE  Social Issues: NONE  ED Room #:  19    ED Nurse Phone Extension - [phone] or may call [phone].      HPI:   Chief Complaint   Patient presents with    Vomiting    Weakness - Generalized       Past Medical History:  Past Medical History:   Diagnosis Date    Diabetes mellitus     Hyperlipidemia     Hypertension     Prostate cancer     Umbilical hernia         Past Surgical History:  Past Surgical History:   Procedure Laterality Date    BLADDER SLING MODIFIED, ANTERIOR AND POSTERIOR VAGINAL REPAIR      ENDOSCOPY N/A 12/19/2022    Procedure: ESOPHAGOGASTRODUODENOSCOPY;  Surgeon: Brunner, Mark I, MD;  Location: [facility] ENDOSCOPY;  Service: Gastroenterology;  Laterality: N/A;    PENILE PROSTHESIS IMPLANT      PROSTATECTOMY      ROTATOR CUFF REPAIR          Admitting Doctor:   Kirstie Cristina MD    Consulting Provider(s):  Consults       No orders found from 9/11/2023 to 10/11/2023.             Admitting Diagnosis:   The primary encounter diagnosis was Sepsis, due to unspecified organism, unspecified whether acute organ dysfunction present. Diagnoses of Pneumonia of right lung due to infectious organism, unspecified part of lung, Immunocompromised, History of prostate cancer, and Referred by health care professional were also pertinent to this visit.    Most Recent Vitals:   Vitals:    10/10/23 0924 10/10/23 1037 10/10/23 1240 10/10/23 1300   BP: 134/82 133/77 140/78 138/80   BP Location: Left arm  Right arm Right arm   Patient Position: Sitting   Lying   Pulse: 81 76 86 82   Resp: 16 14 18 18   Temp: 98.7 øF (37.1 øC)  (!) 100.8 øF (38.2 øC)    TempSrc: Oral  Oral    SpO2: 97% 97% 98% 96%   Weight: 88.9 kg (196 lb)      Height: 175.3 cm (69\")          Active LDAs/IV Access:   Lines, Drains & Airways       Active LDAs       " Name Placement date Placement time Site Days    Peripheral IV 10/10/23 1057 Anterior;Distal;Left;Upper Arm 10/10/23  1057  Arm  less than 1                    Labs (abnormal labs have a star):   Labs Reviewed   RESPIRATORY PANEL PCR W/ COVID-19 (SARS-COV-2) TOBI/HEAVEN/TAZ/PAD/COR/MAD/CHIP IN-HOUSE, NP SWAB IN UTM/VTP, 3-4 HR TAT - Abnormal; Notable for the following components:       Result Value    Human Rhinovirus/Enterovirus Detected (*)     All other components within normal limits    Narrative:     In the setting of a positive respiratory panel with a viral infection PLUS a negative procalcitonin without other underlying concern for bacterial infection, consider observing off antibiotics or discontinuation of antibiotics and continue supportive care. If the respiratory panel is positive for atypical bacterial infection (Bordetella pertussis, Chlamydophila pneumoniae, or Mycoplasma pneumoniae), consider antibiotic de-escalation to target atypical bacterial infection.   COMPREHENSIVE METABOLIC PANEL - Abnormal; Notable for the following components:    Glucose 201 (*)     Sodium 132 (*)     Chloride 96 (*)     Total Protein 9.0 (*)     Total Bilirubin 1.7 (*)     All other components within normal limits    Narrative:     GFR Normal >60  Chronic Kidney Disease <60  Kidney Failure <15     URINALYSIS W/ MICROSCOPIC IF INDICATED (NO CULTURE) - Abnormal; Notable for the following components:    Blood, UA Moderate (2+) (*)     Protein,  mg/dL (2+) (*)     Urobilinogen, UA 4.0 E.U./dL (*)     All other components within normal limits   CBC WITH AUTO DIFFERENTIAL - Abnormal; Notable for the following components:    WBC 15.54 (*)     RBC 3.41 (*)     Hemoglobin 11.4 (*)     Hematocrit 32.3 (*)     MCH 33.4 (*)     Neutrophil % 90.2 (*)     Lymphocyte % 4.6 (*)     Monocyte % 2.9 (*)     Eosinophil % 0.0 (*)     Immature Grans % 2.2 (*)     Neutrophils, Absolute 14.02 (*)     Immature Grans, Absolute 0.34 (*)     All  "other components within normal limits   LACTIC ACID, PLASMA - Abnormal; Notable for the following components:    Lactate 2.1 (*)     All other components within normal limits   PROCALCITONIN - Abnormal; Notable for the following components:    Procalcitonin 6.45 (*)     All other components within normal limits    Narrative:     As a Marker for Sepsis (Non-Neonates):    1. <0.5 ng/mL represents a low risk of severe sepsis and/or septic shock.  2. >2 ng/mL represents a high risk of severe sepsis and/or septic shock.    As a Marker for Lower Respiratory Tract Infections that require antibiotic therapy:    PCT on Admission    Antibiotic Therapy       6-12 Hrs later    >0.5                Strongly Recommended  >0.25 - <0.5        Recommended   0.1 - 0.25          Discouraged              Remeasure/reassess PCT  <0.1                Strongly Discouraged     Remeasure/reassess PCT    As 28 day mortality risk marker: \"Change in Procalcitonin Result\" (>80% or <=80%) if Day 0 (or Day 1) and Day 4 values are available. Refer to http://www.[email or web address]-pct-calculator.com    Change in PCT <=80%  A decrease of PCT levels below or equal to 80% defines a positive change in PCT test result representing a higher risk for 28-day all-cause mortality of patients diagnosed with severe sepsis for septic shock.    Change in PCT >80%  A decrease of PCT levels of more than 80% defines a negative change in PCT result representing a lower risk for 28-day all-cause mortality of patients diagnosed with severe sepsis or septic shock.      SINGLE HSTROPONIN T - Normal    Narrative:     High Sensitive Troponin T Reference Range:  <10.0 ng/L- Negative Female for AMI  <15.0 ng/L- Negative Male for AMI  >=10 - Abnormal Female indicating possible myocardial injury.  >=15 - Abnormal Male indicating possible myocardial injury.   Clinicians would have to utilize clinical acumen, EKG, Troponin, and serial changes to determine if it is an Acute Myocardial " Infarction or myocardial injury due to an underlying chronic condition.        MAGNESIUM - Normal   COVID PRE-OP / PRE-PROCEDURE SCREENING ORDER (NO ISOLATION)    Narrative:     The following orders were created for panel order COVID PRE-OP / PRE-PROCEDURE SCREENING ORDER (NO ISOLATION) - Swab, Nasopharynx.  Procedure                               Abnormality         Status                     ---------                               -----------         ------                     Respiratory Panel PCR w/...[[id number]]  Abnormal            Final result                 Please view results for these tests on the individual orders.   BLOOD CULTURE   BLOOD CULTURE   RESPIRATORY CULTURE   MRSA SCREEN, PCR   URINALYSIS, MICROSCOPIC ONLY   RAINBOW DRAW    Narrative:     The following orders were created for panel order [place] Draw.  Procedure                               Abnormality         Status                     ---------                               -----------         ------                     Green Top (Gel)[[id number]]                                  Final result               Lavender Top[[id number]]                                     Final result               Gold Top - SST[[id number]]                                   Final result               Gray Top[[id number]]                                         In process                 Light Blue Top[[id number]]                                   Final result                 Please view results for these tests on the individual orders.   LACTIC ACID, REFLEX   POCT GLUCOSE FINGERSTICK   CBC AND DIFFERENTIAL    Narrative:     The following orders were created for panel order CBC & Differential.  Procedure                               Abnormality         Status                     ---------                               -----------         ------                     CBC Auto Differential[[id number]]        Abnormal            Final result                 Please view  results for these tests on the individual orders.   GREEN TOP   LAVENDER TOP   GOLD TOP - SST   LIGHT BLUE TOP   GRAY TOP       Meds Given in ED:   Medications   sodium chloride 0.9 % flush 10 mL (has no administration in time range)   vancomycin 1750 mg/500 mL 0.9% NS IVPB ([facility]) (has no administration in time range)   lactated ringers infusion (has no administration in time range)   sodium chloride 0.9 % bolus 1,000 mL (0 mL Intravenous Stopped 10/10/23 1315)   iopamidol (ISOVUE-370) 76 % injection 85 mL (85 mL Intravenous Given 10/10/23 1205)   piperacillin-tazobactam (ZOSYN) 3.375 g in iso-osmotic dextrose 50 ml (premix) (0 g Intravenous Stopped 10/10/23 1313)   prochlorperazine (COMPAZINE) injection 10 mg (10 mg Intravenous Given 10/10/23 1257)   HYDROmorphone (DILAUDID) injection 0.5 mg (0.5 mg Intravenous Given 10/10/23 1257)   sodium chloride 0.9 % bolus 1,000 mL (1,000 mL Intravenous New Bag 10/10/23 1314)     lactated ringers, 100 mL/hr

## 2023-10-10 NOTE — Clinical Note
Level of Care: Telemetry [5]   Diagnosis: PNA (pneumonia) [688420]   Certification: I Certify That Inpatient Hospital Services Are Medically Necessary For Greater Than 2 Midnights

## 2023-10-10 NOTE — PLAN OF CARE
Goal Outcome Evaluation:  Plan of Care Reviewed With: patient, spouse        Progress: no change  Outcome Evaluation: Patient was an admit from the ED for PNA, sepsis. Patient has c/o fever, chills and states he just doesn't feel well. Patient has received boluses in the ED and one on the floor. He is getting Zosyn and tylenol was given for a 102.6 temp. Other VSS.

## 2023-10-10 NOTE — ED PROVIDER NOTES
Subjective   History of Present Illness  Patient sent from his regular doctor's office for further evaluation of nausea vomiting and overall generalized weakness.  He tells me he has a history of prostate cancer and he tells me he has been metastasized and is terminal per his words.  He follows with urology and oncology and MD Renee in [place].  Patient has reported a fever chills and a cough.        Review of Systems    Past Medical History:   Diagnosis Date    Diabetes mellitus     Hyperlipidemia     Hypertension     Prostate cancer     Umbilical hernia        No Known Allergies    Past Surgical History:   Procedure Laterality Date    BLADDER SLING MODIFIED, ANTERIOR AND POSTERIOR VAGINAL REPAIR      ENDOSCOPY N/A 12/19/2022    Procedure: ESOPHAGOGASTRODUODENOSCOPY;  Surgeon: Brunner, Mark I, MD;  Location: [facility] ENDOSCOPY;  Service: Gastroenterology;  Laterality: N/A;    PENILE PROSTHESIS IMPLANT      PROSTATECTOMY      ROTATOR CUFF REPAIR         Family History   Problem Relation Age of Onset    Cancer Mother     Cancer Father     Cancer Maternal Grandmother     Cancer Paternal Grandfather        Social History     Socioeconomic History    Marital status:    Tobacco Use    Smoking status: Never    Smokeless tobacco: Never   Substance and Sexual Activity    Alcohol use: Not Currently     Alcohol/week: 6.0 standard drinks of alcohol     Types: 6 Glasses of wine per week    Drug use: Never    Sexual activity: Defer           Objective   Physical Exam  Constitutional:       Appearance: He is well-developed. He is ill-appearing.   HENT:      Head: Normocephalic and atraumatic.      Right Ear: External ear normal.      Left Ear: External ear normal.      Nose: Nose normal.   Eyes:      Conjunctiva/sclera: Conjunctivae normal.      Pupils: Pupils are equal, round, and reactive to light.   Cardiovascular:      Rate and Rhythm: Normal rate and regular rhythm.      Heart sounds: Normal heart sounds.   Pulmonary:       Effort: Pulmonary effort is normal.      Breath sounds: Decreased breath sounds present.   Abdominal:      General: Bowel sounds are normal.      Palpations: Abdomen is soft.   Musculoskeletal:         General: Normal range of motion.      Cervical back: Normal range of motion and neck supple.   Skin:     General: Skin is warm and dry.   Neurological:      Mental Status: He is alert and oriented to person, place, and time.   Psychiatric:         Behavior: Behavior normal.         Judgment: Judgment normal.         Critical Care    Performed by: Edgardo Mejia APRN  Authorized by: Vinay Cabrera MD    Critical care provider statement:     Critical care time (minutes):  35    Critical care time was exclusive of:  Separately billable procedures and treating other patients    Critical care was time spent personally by me on the following activities:  Ordering and performing treatments and interventions, ordering and review of laboratory studies, ordering and review of radiographic studies, pulse oximetry, re-evaluation of patient's condition, review of old charts, obtaining history from patient or surrogate, examination of patient, evaluation of patient's response to treatment, discussions with consultants and development of treatment plan with patient or surrogate             ED Course  ED Course as of 10/10/23 1254   Tue Oct 10, 2023   1242 Treating for sepsis related to his elevated procalcitonin and lactic acid.  I did discuss with the results with the patient on the CAT scan results.  I have offered to discuss the case with his wife who is currently out of the room as well as his daughter who works in healthcare.  Patient aware the findings of the CAT scan including the pneumonia.  He will need to be admitted to the hospital for further evaluation. [JM]      ED Course User Index  [JM] Edgardo Mejia APRN                CT Angiogram Chest   Final Result   Impression:   1.Posterior right upper lobe pneumonia.    2.No evidence of pulmonary embolism.   3.Other incidental nonemergent findings as detailed above. No definitive metastatic disease identified.            Electronically Signed: Uri De Leon MD     10/10/2023 11:22 AM CDT     Workstation ID: [id number]      CT Abdomen Pelvis With Contrast   Final Result   Impression:   1.Posterior right upper lobe pneumonia.   2.No evidence of pulmonary embolism.   3.Other incidental nonemergent findings as detailed above. No definitive metastatic disease identified.            Electronically Signed: Uri De Leon MD     10/10/2023 11:22 AM CDT     Workstation ID: [id number]      XR Chest 1 View   Final Result   Impression:   Large opacity on the right likely relates to pneumonia although underlying mass is not excluded and follow-up exam to clearing recommended.         Electronically Signed: Mervat Hooker MD     10/10/2023 11:06 AM EDT     Workstation ID: [id number]                                     Medical Decision Making  Amount and/or Complexity of Data Reviewed  External Data Reviewed: labs and radiology.  Labs: ordered. Decision-making details documented in ED Course.  Radiology: ordered. Decision-making details documented in ED Course.  ECG/medicine tests: ordered.    Risk  Prescription drug management.  Decision regarding hospitalization.    Critical Care  Total time providing critical care: 35 minutes        Final diagnoses:   Sepsis, due to unspecified organism, unspecified whether acute organ dysfunction present   Pneumonia of right lung due to infectious organism, unspecified part of lung   Immunocompromised   History of prostate cancer   Referred by health care professional       ED Disposition  ED Disposition       ED Disposition   Decision to Admit    Condition   --    Comment   Level of Care: Telemetry [5]   Diagnosis: PNA (pneumonia) [[id number]]   Certification: I Certify That Inpatient Hospital Services Are Medically Necessary For Greater Than 2 Midnights                  No follow-up provider specified.       Medication List      No changes were made to your prescriptions during this visit.            Edgardo Mejia, APRN  10/10/23 8999

## 2023-10-10 NOTE — CASE MANAGEMENT/SOCIAL WORK
Discharge Planning Assessment  [facility]     Patient Name: Julian Rodriguez  MRN: [id number]  Today's Date: 10/10/2023    Admit Date: 10/10/2023    Plan: IDP   Discharge Needs Assessment       Row Name 10/10/23 1337       Living Environment    People in Home spouse    Name(s) of People in Home Noemi Rodriguez    Current Living Arrangements home    Potentially Unsafe Housing Conditions unable to assess    Primary Care Provided by self    Provides Primary Care For no one    Family Caregiver if Needed spouse    Family Caregiver Names Noemi Rodriguez    Quality of Family Relationships involved;helpful;supportive       Resource/Environmental Concerns    Transportation Concerns none       Transition Planning    Patient/Family Anticipates Transition to home with family    Transportation Anticipated family or friend will provide       Discharge Needs Assessment    Readmission Within the Last 30 Days no previous admission in last 30 days    Equipment Currently Used at Home none                   Discharge Plan       Row Name 10/10/23 9104       Plan    Plan IDP    Plan Comments MSW met with pt. and his spouse Noemi Rodriguez at bedside. Pt. lives in [facility] with his spouse. Pt.'s PCP is Miranda Dawson. Pt.'s pharmacy is [facility]. Pt.'s insurance is [employer] Medicare Replacement. Pt. reports that he is independent at baseline. Pt. denies DME, O2, and HH services at this time. Pt. reports he has transportation back home when he is medically ready to d/c.  Pt.'s goal is to return home. Pt. doesn't have an advanced directive or ACP documentation on file. CM will continue to follow pt. throughout his stay.    Final Discharge Disposition Code 30 - still a patient                  Continued Care and Services - Admitted Since 10/10/2023    Coordination has not been started for this encounter.          Demographic Summary       Row Name 10/10/23 6459       General Information    Admission Type inpatient    Arrived From home     Referral Source admission list;emergency department    Reason for Consult discharge planning    Preferred Language English                   Functional Status       Row Name 10/10/23 7756       Functional Status, IADL    Medications independent    Meal Preparation independent    Housekeeping independent    Laundry independent    Shopping independent       Mental Status Summary    Recent Changes in Mental Status/Cognitive Functioning unable to assess                   Psychosocial    No documentation.                  Abuse/Neglect    No documentation.                  Legal    No documentation.                  Substance Abuse    No documentation.                  Patient Forms    No documentation.                     SHANE Curtis

## 2023-10-11 LAB
ANION GAP SERPL CALCULATED.3IONS-SCNC: 7 MMOL/L (ref 5–15)
BACTERIA BLD CULT: ABNORMAL
BOTTLE TYPE: ABNORMAL
BUN SERPL-MCNC: 11 MG/DL (ref 8–23)
BUN/CREAT SERPL: 12.8 (ref 7–25)
CALCIUM SPEC-SCNC: 8.5 MG/DL (ref 8.6–10.5)
CHLORIDE SERPL-SCNC: 101 MMOL/L (ref 98–107)
CO2 SERPL-SCNC: 25 MMOL/L (ref 22–29)
CREAT SERPL-MCNC: 0.86 MG/DL (ref 0.76–1.27)
DEPRECATED RDW RBC AUTO: 44.1 FL (ref 37–54)
EGFRCR SERPLBLD CKD-EPI 2021: 99.1 ML/MIN/1.73
ERYTHROCYTE [DISTWIDTH] IN BLOOD BY AUTOMATED COUNT: 12.8 % (ref 12.3–15.4)
GLUCOSE BLDC GLUCOMTR-MCNC: 139 MG/DL (ref 70–130)
GLUCOSE BLDC GLUCOMTR-MCNC: 152 MG/DL (ref 70–130)
GLUCOSE BLDC GLUCOMTR-MCNC: 160 MG/DL (ref 70–130)
GLUCOSE BLDC GLUCOMTR-MCNC: 201 MG/DL (ref 70–130)
GLUCOSE SERPL-MCNC: 135 MG/DL (ref 65–99)
HCT VFR BLD AUTO: 26 % (ref 37.5–51)
HGB BLD-MCNC: 9.1 G/DL (ref 13–17.7)
MCH RBC QN AUTO: 32.6 PG (ref 26.6–33)
MCHC RBC AUTO-ENTMCNC: 35 G/DL (ref 31.5–35.7)
MCV RBC AUTO: 93.2 FL (ref 79–97)
PLATELET # BLD AUTO: 140 10*3/MM3 (ref 140–450)
PMV BLD AUTO: 10.2 FL (ref 6–12)
POTASSIUM SERPL-SCNC: 3.7 MMOL/L (ref 3.5–5.2)
RBC # BLD AUTO: 2.79 10*6/MM3 (ref 4.14–5.8)
SODIUM SERPL-SCNC: 133 MMOL/L (ref 136–145)
WBC NRBC COR # BLD: 7.75 10*3/MM3 (ref 3.4–10.8)

## 2023-10-11 PROCEDURE — 25010000002 CEFTRIAXONE PER 250 MG: Performed by: INTERNAL MEDICINE

## 2023-10-11 PROCEDURE — 25010000002 ENOXAPARIN PER 10 MG: Performed by: INTERNAL MEDICINE

## 2023-10-11 PROCEDURE — 25010000002 HYDROMORPHONE PER 4 MG: Performed by: INTERNAL MEDICINE

## 2023-10-11 PROCEDURE — 25010000002 PIPERACILLIN SOD-TAZOBACTAM PER 1 G: Performed by: INTERNAL MEDICINE

## 2023-10-11 PROCEDURE — 63710000001 INSULIN LISPRO (HUMAN) PER 5 UNITS: Performed by: INTERNAL MEDICINE

## 2023-10-11 PROCEDURE — 80048 BASIC METABOLIC PNL TOTAL CA: CPT | Performed by: INTERNAL MEDICINE

## 2023-10-11 PROCEDURE — 82948 REAGENT STRIP/BLOOD GLUCOSE: CPT

## 2023-10-11 PROCEDURE — 25810000003 LACTATED RINGERS PER 1000 ML: Performed by: INTERNAL MEDICINE

## 2023-10-11 PROCEDURE — 25010000002 ONDANSETRON PER 1 MG: Performed by: INTERNAL MEDICINE

## 2023-10-11 PROCEDURE — G0378 HOSPITAL OBSERVATION PER HR: HCPCS

## 2023-10-11 PROCEDURE — 85027 COMPLETE CBC AUTOMATED: CPT | Performed by: INTERNAL MEDICINE

## 2023-10-11 RX ADMIN — PANCRELIPASE 36000 UNITS OF LIPASE: 36000; 180000; 114000 CAPSULE, DELAYED RELEASE PELLETS ORAL at 12:36

## 2023-10-11 RX ADMIN — ONDANSETRON 4 MG: 2 INJECTION INTRAMUSCULAR; INTRAVENOUS at 05:37

## 2023-10-11 RX ADMIN — HYDROCODONE BITARTRATE AND ACETAMINOPHEN 1 TABLET: 5; 325 TABLET ORAL at 18:47

## 2023-10-11 RX ADMIN — INSULIN LISPRO 2 UNITS: 100 INJECTION, SOLUTION INTRAVENOUS; SUBCUTANEOUS at 17:38

## 2023-10-11 RX ADMIN — HYDROCODONE BITARTRATE AND ACETAMINOPHEN 1 TABLET: 5; 325 TABLET ORAL at 14:23

## 2023-10-11 RX ADMIN — PANCRELIPASE 36000 UNITS OF LIPASE: 36000; 180000; 114000 CAPSULE, DELAYED RELEASE PELLETS ORAL at 17:38

## 2023-10-11 RX ADMIN — HYDROMORPHONE HYDROCHLORIDE 0.25 MG: 1 INJECTION, SOLUTION INTRAMUSCULAR; INTRAVENOUS; SUBCUTANEOUS at 22:43

## 2023-10-11 RX ADMIN — PANCRELIPASE 36000 UNITS OF LIPASE: 36000; 180000; 114000 CAPSULE, DELAYED RELEASE PELLETS ORAL at 09:04

## 2023-10-11 RX ADMIN — INSULIN LISPRO 2 UNITS: 100 INJECTION, SOLUTION INTRAVENOUS; SUBCUTANEOUS at 12:36

## 2023-10-11 RX ADMIN — HYDROMORPHONE HYDROCHLORIDE 0.25 MG: 1 INJECTION, SOLUTION INTRAMUSCULAR; INTRAVENOUS; SUBCUTANEOUS at 09:58

## 2023-10-11 RX ADMIN — PIPERACILLIN SODIUM AND TAZOBACTAM SODIUM 3.38 G: 3; .375 INJECTION, SOLUTION INTRAVENOUS at 01:51

## 2023-10-11 RX ADMIN — ENOXAPARIN SODIUM 40 MG: 100 INJECTION SUBCUTANEOUS at 09:04

## 2023-10-11 RX ADMIN — Medication 10 ML: at 09:05

## 2023-10-11 RX ADMIN — CEFTRIAXONE SODIUM 2000 MG: 2 INJECTION, POWDER, FOR SOLUTION INTRAMUSCULAR; INTRAVENOUS at 09:03

## 2023-10-11 RX ADMIN — SODIUM CHLORIDE, POTASSIUM CHLORIDE, SODIUM LACTATE AND CALCIUM CHLORIDE 100 ML/HR: 600; 310; 30; 20 INJECTION, SOLUTION INTRAVENOUS at 03:10

## 2023-10-11 RX ADMIN — ASPIRIN 81 MG: 81 TABLET, COATED ORAL at 09:04

## 2023-10-11 RX ADMIN — SODIUM CHLORIDE, POTASSIUM CHLORIDE, SODIUM LACTATE AND CALCIUM CHLORIDE 100 ML/HR: 600; 310; 30; 20 INJECTION, SOLUTION INTRAVENOUS at 15:55

## 2023-10-11 NOTE — PLAN OF CARE
Goal Outcome Evaluation:              Outcome Evaluation: alert and oriented X 4. VSS. RA. given prn dilaudid X 1 and prn tylenol X 1. NSR on tele. No acute events during the shift.

## 2023-10-11 NOTE — CONSULTS
INFECTIOUS DISEASE CONSULT/INITIAL HOSPITAL VISIT    Julian Rodriguez  1965  [id number]    Date of Consult: 10/11/2023    Admission Date: 10/10/2023      Requesting Provider: No ref. provider found  Evaluating Physician: Pedro Sanchez MD    Reason for Consultation: Severe sepsis/pneumococcal bacteremia/pneumococcal pneumonia    History of present illness:    Patient is a 58 y.o. male With prostate cancer who is seen today for evaluation of severe sepsis secondary to bacteremic pneumococcal pneumonia.  On Saturday evening he developed malaise, nausea, vomiting, fevers to 102ø, and shaking chills.  He complained of a cough with yellowish sputum production.  He was exposed to a granddaughter with RSV and thought that he probably had RSV infection. He presented to his primary care provider's office on Monday and was instructed to go to the emergency room. In the ER he was noted to have a pro-calcitonin of 6.5, lactic acid of 2.1,And white blood cell count of 15.5.A chest CT scan revealed a lobar right posterior upper lobe pneumonia.  A respiratory virus panel PCR was positive for rhinovirus.He was admitted to the hospital by Dr. Cristina who is concerned about a superimposed bacterial infection due to his lobar pneumonia and rigors.  He was started on intravenous Zosyn therapy.  Nasal MRSA PCR was negative.  Blood cultures are now positive for pneumococcus. His maximum fever since admission is 102.6ø. Shaking chills have improved.He currently feels better.  He denies pleuritic chest pain.    Past Medical History:   Diagnosis Date    Diabetes mellitus     Hyperlipidemia     Hypertension     Prostate cancer     Umbilical hernia        Past Surgical History:   Procedure Laterality Date    BLADDER SLING MODIFIED, ANTERIOR AND POSTERIOR VAGINAL REPAIR      ENDOSCOPY N/A 12/19/2022    Procedure: ESOPHAGOGASTRODUODENOSCOPY;  Surgeon: Brunner, Mark I, MD;  Location: [facility] ENDOSCOPY;  Service: Gastroenterology;   Laterality: N/A;    PENILE PROSTHESIS IMPLANT      PROSTATECTOMY      ROTATOR CUFF REPAIR         Family History   Problem Relation Age of Onset    Cancer Mother     Cancer Father     Cancer Maternal Grandmother     Cancer Paternal Grandfather        Social History     Socioeconomic History    Marital status:    Tobacco Use    Smoking status: Never    Smokeless tobacco: Never   Vaping Use    Vaping Use: Never used   Substance and Sexual Activity    Alcohol use: Not Currently     Alcohol/week: 6.0 standard drinks of alcohol     Types: 6 Glasses of wine per week    Drug use: Never    Sexual activity: Defer       No Known Allergies      Medication:    Current Facility-Administered Medications:     acetaminophen (TYLENOL) tablet 650 mg, 650 mg, Oral, Q4H PRN, Kirstie Cristina MD, 650 mg at 10/10/23 1629    aspirin EC tablet 81 mg, 81 mg, Oral, Daily, Kirstie Cristina MD    atorvastatin (LIPITOR) tablet 10 mg, 10 mg, Oral, Nightly, Kirstie Cristina MD, 10 mg at 10/10/23 2001    sennosides-docusate (PERICOLACE) 8.6-50 MG per tablet 2 tablet, 2 tablet, Oral, BID **AND** polyethylene glycol (MIRALAX) packet 17 g, 17 g, Oral, Daily PRN **AND** bisacodyl (DULCOLAX) EC tablet 5 mg, 5 mg, Oral, Daily PRN **AND** bisacodyl (DULCOLAX) suppository 10 mg, 10 mg, Rectal, Daily PRN, Kirstie Cristina MD    Calcium Replacement - Follow Nurse / BPA Driven Protocol, , Does not apply, PRN, Kirstie Cristina MD    dextrose (D50W) (25 g/50 mL) IV injection 25 g, 25 g, Intravenous, Q15 Min PRN, Kirstie Cristina MD    dextrose (GLUTOSE) oral gel 15 g, 15 g, Oral, Q15 Min PRN, Kirstie Cristina MD    Enoxaparin Sodium (LOVENOX) syringe 40 mg, 40 mg, Subcutaneous, Daily, Kirstie Cristina MD, 40 mg at 10/10/23 1628    glucagon (GLUCAGEN) injection 1 mg, 1 mg, Intramuscular, Q15 Min PRN, Kirstie Cristina MD    HYDROcodone-acetaminophen (NORCO) 5-325 MG per tablet 1 tablet, 1 tablet, Oral, Q4H PRN, Kirstie Cristina MD, 1 tablet at 10/10/23 2001    HYDROmorphone  (DILAUDID) injection 0.25 mg, 0.25 mg, Intravenous, Q4H PRN, 0.25 mg at 10/10/23 2202 **AND** naloxone (NARCAN) injection 0.4 mg, 0.4 mg, Intravenous, Q5 Min PRN, Kirstie Cristina MD    Insulin Lispro (humaLOG) injection 2-7 Units, 2-7 Units, Subcutaneous, 4x Daily AC & at Bedtime, Kirstie Cristina MD, 3 Units at 10/10/23 1736    lactated ringers infusion, 100 mL/hr, Intravenous, Continuous, Kirstie Cristina MD, Last Rate: 100 mL/hr at 10/11/23 0310, 100 mL/hr at 10/11/23 0310    Magnesium Standard Dose Replacement - Follow Nurse / BPA Driven Protocol, , Does not apply, PRN, Kirstie Cristina MD    ondansetron (ZOFRAN) tablet 4 mg, 4 mg, Oral, Q6H PRN **OR** ondansetron (ZOFRAN) injection 4 mg, 4 mg, Intravenous, Q6H PRN, Kirstie Cristina MD, 4 mg at 10/11/23 0537    Pancrelipase (Lip-Prot-Amyl) (CREON) capsule 36,000 units of lipase, 36,000 units of lipase, Oral, TID With Meals, Kirstie Cristina MD, 36,000 units of lipase at 10/10/23 1736    Phosphorus Replacement - Follow Nurse / BPA Driven Protocol, , Does not apply, PRN, Kirstie Cristina MD    piperacillin-tazobactam (ZOSYN) 3.375 g in iso-osmotic dextrose 50 ml (premix), 3.375 g, Intravenous, Q8H, Kirstie Cristina MD, 3.375 g at 10/11/23 0151    Potassium Replacement - Follow Nurse / BPA Driven Protocol, , Does not apply, PRN, Kirstie Cristina MD    relugolix (ORGOVYX) tablet 120 mg **PATIENT SUPPLIED, 120 mg, Oral, Daily, Kirstie Cristina MD    sodium chloride 0.9 % flush 10 mL, 10 mL, Intravenous, PRN, Kirstie Cristina MD    sodium chloride 0.9 % flush 10 mL, 10 mL, Intravenous, Q12H, Kirstie Cristina MD    sodium chloride 0.9 % flush 10 mL, 10 mL, Intravenous, PRN, Kirstie Cristina MD    sodium chloride 0.9 % infusion 40 mL, 40 mL, Intravenous, PRN, Kirstie Cristina MD    Antibiotics:  Anti-Infectives (From admission, onward)      Ordered     Dose/Rate Route Frequency Start Stop    10/10/23 1414  piperacillin-tazobactam (ZOSYN) 3.375 g in iso-osmotic dextrose 50 ml (premix)         Ordering Provider: Kirstie Cristina MD    3.375 g  over 4 Hours Intravenous Every 8 Hours 10/10/23 1800 10/15/23 1759    10/10/23 1209  piperacillin-tazobactam (ZOSYN) 3.375 g in iso-osmotic dextrose 50 ml (premix)        Ordering Provider: Edgardo Mejia APRN    3.375 g  over 30 Minutes Intravenous Once 10/10/23 1225 10/10/23 1313              Review of Systems:  Constitutional-- Fevers and shaking chills.  HEENT-- he has a mild headache but this has abated.  He denies earache and sore throat.  CV-- No chest pain. No history of valvular heart disease.  Resp-- He denies dyspnea.  He has a cough with yellowish sputum production.  He denies pleuritic chest pain and hemoptysis.  GI- He has chronic nausea and vomiting.  -- He has metastatic prostate cancer.  Lymph- no swollen lymph nodes in neck/axilla or groin.   Heme- No active bruising or bleeding;.  MS-- no swelling or pain in the bones or joints of arms/legs.  No new back pain.  Neuro-- No acute focal weakness or numbness in the arms or legs.   Skin--No rashes or lesions      Physical Exam:   Vital Signs  Temp (24hrs), Av.4 øF (38 øC), Min:98.7 øF (37.1 øC), Max:102.6 øF (39.2 øC)    Temp  Min: 98.7 øF (37.1 øC)  Max: 102.6 øF (39.2 øC)  BP  Min: 110/62  Max: 140/78  Pulse  Min: 76  Max: 98  Resp  Min: 12  Max: 18  SpO2  Min: 94 %  Max: 98 %    GENERAL: Awake and alert, in no acute distress.   HEENT: Normocephalic, atraumatic.  PERRL. EOMI. No conjunctival injection. No icterus. Oropharynx clear without evidence of thrush or exudate  NECK: Supple .  LYMPH: No cervical, axillary or inguinal lymphadenopathy.  HEART: RRR; No murmur, rubs, gallops.   LUNGS: Mild right-sided crackles..  ABDOMEN: Soft, nontender, nondistended. . No rebound or guarding. NO mass or HSM.  EXT:  No cyanosis, clubbing or edema. No cord.  :  Without Shepherd catheter.  MSK: No joint effusions or erythema  SKIN: Warm and dry without cutaneous eruptions on Inspection/palpation.    NEURO:  "Oriented to PPT.  Motor 5/5 strength  PSYCHIATRIC: Normal insight and judgment. Cooperative with PE    Laboratory Data    Results from last 7 days   Lab Units 10/11/23  0425 10/10/23  1056   WBC 10*3/mm3 7.75 15.54*   HEMOGLOBIN g/dL 9.1* 11.4*   HEMATOCRIT % 26.0* 32.3*   PLATELETS 10*3/mm3 140 160     Results from last 7 days   Lab Units 10/11/23  0425   SODIUM mmol/L 133*   POTASSIUM mmol/L 3.7   CHLORIDE mmol/L 101   CO2 mmol/L 25.0   BUN mg/dL 11   CREATININE mg/dL 0.86   GLUCOSE mg/dL 135*   CALCIUM mg/dL 8.5*     Results from last 7 days   Lab Units 10/10/23  1056   ALK PHOS U/L 82   BILIRUBIN mg/dL 1.7*   ALT (SGPT) U/L 27   AST (SGOT) U/L 32             Results from last 7 days   Lab Units 10/10/23  1056   LACTATE mmol/L 2.1*             Estimated Creatinine Clearance: 100.8 mL/min (by C-G formula based on SCr of 0.86 mg/dL).      Microbiology:  Blood Culture   Date Value Ref Range Status   10/10/2023 Abnormal Stain (C)  Preliminary     BCID, PCR   Date Value Ref Range Status   10/10/2023 (A) Negative by BCID PCR. Culture to Follow. Final    Streptococcus pneumoniae. Identification by BCID2 PCR.     No results found for: \"CULTURES\", \"HSVCX\", \"URCX\"  No results found for: \"EYECULTURE\", \"GCCX\", \"HSVCULTURE\", \"LABHSV\"  No results found for: \"LEGIONELLA\", \"MRSACX\", \"MUMPSCX\", \"MYCOPLASCX\"  No results found for: \"NOCARDIACX\", \"STOOLCX\"  No results found for: \"THROATCX\", \"UNSTIMCULT\", \"URINECX\", \"CULTURE\", \"VZVCULTUR\"  No results found for: \"VIRALCULTU\", \"WOUNDCX\"        Radiology:  Imaging Results (Last 72 Hours)       Procedure Component Value Units Date/Time    CT Angiogram Chest [[id number]] Collected: 10/10/23 1212     Updated: 10/10/23 1225    Narrative:      CT ANGIOGRAM CHEST, CT ABDOMEN PELVIS W CONTRAST    Date of Exam: 10/10/2023 10:49 AM CDT    Indication: pe. lung mass. hx of prostate cancer..    Comparison: CT abdomen pelvis 9/29/2021    Technique: CTA of the chest and CT abdomen and pelvis were " performed after the uneventful intravenous administration of 85 cc Isovue-370. Reconstructed coronal and sagittal images were also obtained. In addition, a 3-D volume rendered image was created   for interpretation. Automated exposure control and iterative reconstruction methods were used.      Findings:    CT PULMONARY ANGIOGRAM   PULMONARY VASCULATURE: Pulmonary arteries are widely patent without evidence of embolus.Main pulmonary artery is normal in size. No evidence of right heart strain.    MEDIASTINUM:Unremarkable. Aortic and heart size are normal. No aortic dissection identified. No mass nor pericardial effusion.  CORONARY ARTERIES: Moderate calcified atherosclerotic disease.  LUNGS: There is dense consolidation of the posterior right upper lobe consistent with pneumonia. Mild bronchial wall thickening leading into this region. Lungs are otherwise clear. No nodule identified.  PLEURAL SPACE: No effusion, mass, nor pneumothorax.  LYMPH NODES: There are no pathologically enlarged lymph nodes.      CT ABDOMEN AND PELVIS  LIVER:  Unremarkable parenchyma without focal lesion.  BILIARY/GALLBLADDER:  Unremarkable  SPLEEN:  Unremarkable  PANCREAS:  Unremarkable  ADRENAL:  Unremarkable  KIDNEYS:  Unremarkable parenchyma with no solid mass identified. No obstruction.  No calculus identified.  GASTROINTESTINAL/MESENTERY:  No evidence of obstruction nor inflammation.  Colonic diverticulosis. No evidence of acute diverticulitis. The appendix is normal.  MESENTERIC VESSELS:  Patent.  AORTA/IVC:  Normal caliber.    RETROPERITONEUM/LYMPH NODES:  Unremarkable    REPRODUCTIVE: Prostatectomy. There is a penile implant.  BLADDER:  Unremarkable    OSSEUS STRUCTURES: There are multiple spinal compression fractures with previous kyphoplasty of T6. There are likely old right-sided rib fractures. No definite suspicious bony lesion identified. If osseous metastatic disease is suspected, nuclear   medicine bone scan might be  useful.        Impression:      Impression:  1.Posterior right upper lobe pneumonia.  2.No evidence of pulmonary embolism.  3.Other incidental nonemergent findings as detailed above. No definitive metastatic disease identified.        Electronically Signed: Uri De Leon MD    10/10/2023 11:22 AM CDT    Workstation ID: [id number]    CT Abdomen Pelvis With Contrast [[id number]] Collected: 10/10/23 1212     Updated: 10/10/23 1225    Narrative:      CT ANGIOGRAM CHEST, CT ABDOMEN PELVIS W CONTRAST    Date of Exam: 10/10/2023 10:49 AM CDT    Indication: pe. lung mass. hx of prostate cancer..    Comparison: CT abdomen pelvis 9/29/2021    Technique: CTA of the chest and CT abdomen and pelvis were performed after the uneventful intravenous administration of 85 cc Isovue-370. Reconstructed coronal and sagittal images were also obtained. In addition, a 3-D volume rendered image was created   for interpretation. Automated exposure control and iterative reconstruction methods were used.      Findings:    CT PULMONARY ANGIOGRAM   PULMONARY VASCULATURE: Pulmonary arteries are widely patent without evidence of embolus.Main pulmonary artery is normal in size. No evidence of right heart strain.    MEDIASTINUM:Unremarkable. Aortic and heart size are normal. No aortic dissection identified. No mass nor pericardial effusion.  CORONARY ARTERIES: Moderate calcified atherosclerotic disease.  LUNGS: There is dense consolidation of the posterior right upper lobe consistent with pneumonia. Mild bronchial wall thickening leading into this region. Lungs are otherwise clear. No nodule identified.  PLEURAL SPACE: No effusion, mass, nor pneumothorax.  LYMPH NODES: There are no pathologically enlarged lymph nodes.      CT ABDOMEN AND PELVIS  LIVER:  Unremarkable parenchyma without focal lesion.  BILIARY/GALLBLADDER:  Unremarkable  SPLEEN:  Unremarkable  PANCREAS:  Unremarkable  ADRENAL:  Unremarkable  KIDNEYS:  Unremarkable parenchyma with no solid  mass identified. No obstruction.  No calculus identified.  GASTROINTESTINAL/MESENTERY:  No evidence of obstruction nor inflammation.  Colonic diverticulosis. No evidence of acute diverticulitis. The appendix is normal.  MESENTERIC VESSELS:  Patent.  AORTA/IVC:  Normal caliber.    RETROPERITONEUM/LYMPH NODES:  Unremarkable    REPRODUCTIVE: Prostatectomy. There is a penile implant.  BLADDER:  Unremarkable    OSSEUS STRUCTURES: There are multiple spinal compression fractures with previous kyphoplasty of T6. There are likely old right-sided rib fractures. No definite suspicious bony lesion identified. If osseous metastatic disease is suspected, nuclear   medicine bone scan might be useful.        Impression:      Impression:  1.Posterior right upper lobe pneumonia.  2.No evidence of pulmonary embolism.  3.Other incidental nonemergent findings as detailed above. No definitive metastatic disease identified.        Electronically Signed: Uri De Leon MD    10/10/2023 11:22 AM CDT    Workstation ID: [id number]    XR Chest 1 View [[id number]] Collected: 10/10/23 1105     Updated: 10/10/23 1109    Narrative:      XR CHEST 1 VW    Date of Exam: 10/10/2023 10:46 AM EDT    Indication: Weak/Dizzy/AMS triage protocol    Comparison: 12/17/2022.    Findings:  There is a new large opacity of the right upper lobe extending into the right hilum. The left lung is clear. The heart size is normal. There are no definite effusions.      Impression:      Impression:  Large opacity on the right likely relates to pneumonia although underlying mass is not excluded and follow-up exam to clearing recommended.      Electronically Signed: Mervat Hooker MD    10/10/2023 11:06 AM EDT    Workstation ID: [id number]        I rate his radiographic images.      Impression:    Severe sepsis-manifested by lactic acidosis, leukocytosis/neutrophilia,uncontrolled diabetes mellitus, an elevated pro-calcitonin, fever to 102.6ø, and a known focus of infection with  bacteremic pneumococcal pneumonia.  Pneumococcal right upper lobe lobar pneumonia  Pneumococcal bacteremia  Rhinovirus URI  Metastatic prostate cancer  Leukocytosis/neutrophilia-improving  Type 2 diabetes mellitus-this places him at increased risk for recurrent infection    PLAN/RECOMMENDATIONS:   Thank you for asking us to see Julian Rodriguez, I recommend the following:  Blood cultures-pending  Discontinue Zosyn  Ceftriaxone 2 g IV daily  Prevnar 20 pneumococcal vaccine after he is clinically improved    He is clinically improving.  He will need several more days of intravenous antibiotic therapy.  If he is stable overnight, we may be able to discharge him with acute outpatient care/intravenous antibiotic therapy daily in our office.  I discussed his complex situation with Dr. Wilmer Melara today.  I also discussed his complex situation with Dr. Kirstie Cristina today.  His high complexity medical problems required high complexity medical decision making today.       Pedro Sanchez MD  10/11/2023  07:54 EDT

## 2023-10-11 NOTE — CONSULTS
Clinical Nutrition   Nutrition Assessment  Reason for Visit: MST score 2+, Consult, Unintentional weight loss, Reduced oral intake    Patient Name: Julian Rodriguez  YOB: 1965  MRN: [id number]  Date of Encounter: 10/11/23 09:24 EDT  Admission date: 10/10/2023    Comments:    Pt meets criteria for acute, moderate malnutrition with the indicators of <50% of EEN for >5d, mild muscle wasting and subcutaneous fat loss. Of note, pt on chemo for metastatic prostate cancer.     Advanced diet to Carbohydrate Controlled diet per pt request and MD order for adv diet as tolerated. - Provide Boost Breeze 2x daily    Ordered meals via CBORD with pt for lunch, dinner and breakfast.     Nutrition Assessment   Admission Diagnosis:  PNA (pneumonia) [J18.9]  Pneumonia [J18.9]    Problem List:    Sepsis    Chronic nausea    Prostate cancer    Diabetes mellitus    PNA (pneumonia)  Moderate malnutrition    PMH:   He  has a past medical history of Diabetes mellitus, Hyperlipidemia, Hypertension, Prostate cancer, and Umbilical hernia.    PSH:  He  has a past surgical history that includes Prostatectomy; bladder sling modified, anterior and posterior vaginal repair; Penile prosthesis implant; Rotator cuff repair; and Esophagogastroduodenoscopy (N/A, 12/19/2022).    Applicable Nutrition Concerns:   Skin:  Oral:  GI:    Applicable Interval History:       Reported/Observed/Food/Nutrition Related History:     Pt up in bed, endorsed significant improvements to overall condition. Endorsed poor PO tolerance for ~5d - reporting feelings of hunger and requesting solid foods. Diet advanced during visit and meal orders obtained. Pt takes CREON with meals - RN aware. Has been on oral chemo for several years, denies taste alterations or anorexia. Discussed ONS options - reports trying clear supplement prior to admission but willing to trial Boost Breeze. Denies dysphagia. No further N/V/D. NKFA    Anthropometrics  "    Flowsheet Rows      Flowsheet Row First Filed Value   Admission Height 175.3 cm (69\") Documented at 10/10/2023 0924   Admission Weight 88.9 kg (196 lb) Documented at 10/10/2023 0924            Height: Height: 175.3 cm (69\")  Last Filed Weight: Weight: 88.9 kg (196 lb) (10/10/23 0924)  Method: Weight Method: Stated  BMI: BMI (Calculated): 28.9  BMI classification: Overweight: 25.0-29.9kg/m2   IBW:   155lbs    UBW:     Weight       Weight (kg) Weight (lbs) Weight Method Visit Report   12/17/2022 91.173 kg  201 lb  Stated     10/10/2023 88.905 kg  196 lb  Stated       Weight change:  reported ~5# LOSS but unable to verify due to limited wt data    Nutrition Focused Physical Exam     Date:  10/11       Patient meets criteria for malnutrition diagnosis, see MSA note.     Current Nutrition Prescription   PO: Diet: Liquid Diets; Full Liquid; Fluid Consistency: Thin (IDDSI 0)  Oral Nutrition Supplement: N/A  Intake: Insufficient data    Nutrition Diagnosis   Date:  10/11            Updated:    Problem Malnutrition  acute, moderate   Etiology Energy in < energy out   Signs/Symptoms <50% of EEN for >5d, mild muscle wasting and subcutaneous fat loss   Status: New    Goal:   General: Nutrition to support treatment  PO: Tolerate PO  EN/PN: N/A    Nutrition Intervention      Follow treatment progress, Care plan reviewed, Advise alternate selection, Advised available snacks, Interview for preferences, Menu adjusted, Encourage intake, Supplement provided        Monitoring/Evaluation:   Per protocol, I&O, PO intake, Pertinent labs, GI status, Symptoms, POC/GOC      Dana Chiu MS,RD,LD  Time Spent: 25min    "

## 2023-10-11 NOTE — CONSULTS
Malnutrition Severity Assessment    Patient Name:  Julian Rodriguez  YOB: 1965  MRN: [id number]  Admit Date:  10/10/2023    Patient meets criteria for : Moderate (non-severe) Malnutrition    Comments:  Pt meets criteria for acute, moderate malnutrition with the indicators of <50% of EEN for >5d, mild muscle wasting and subcutaneous fat loss. Of note, pt on chemo for metastatic prostate cancer.     Malnutrition Severity Assessment  Malnutrition Type: Acute Disease or Injury - Related Malnutrition  Malnutrition Type (last 8 hours)       Malnutrition Severity Assessment       Row Name 10/11/23 1028       Malnutrition Severity Assessment    Malnutrition Type Acute Disease or Injury - Related Malnutrition      Row Name 10/11/23 1028       Insufficient Energy Intake     Insufficient Energy Intake Findings Severe    Insufficient Energy Intake  < or equal to 50% of est. energy requirement for > or equal to 5d)      Row Name 10/11/23 1028       Muscle Loss    Loss of Muscle Mass Findings Mild    [place] Region --  mild    Clavicle Bone Region --  mild    [place] Bone Region --  mild    Dorsal Hand Region --  mild    Patellar Region --  mild    Anterior Thigh Region --  mild      Row Name 10/11/23 1028       Fat Loss    Subcutaneous Fat Loss Findings Mild    Orbital Region  --  mild    Upper Arm Region --  mild      Row Name 10/11/23 1028       Criteria Met (Must meet criteria for severity in at least 2 of these categories: M Wasting, Fat Loss, Fluid, Secondary Signs, Wt. Status, Intake)    Patient meets criteria for  Moderate (non-severe) Malnutrition                    Electronically signed by:  Dana Chiu, MS,RD,LD  10/11/23 10:29 EDT

## 2023-10-11 NOTE — PROGRESS NOTES
[facility] Medicine Services  PROGRESS NOTE    Patient Name: Julian Rodriguez  : 1965  MRN: [id number]    Date of Admission: 10/10/2023  Primary Care Physician: Aaliyah Dawson MD    Subjective   Subjective     CC: Dyspnea    HPI: +F/C/sweats. Cough/congestion/pleurisy. Fatigued.      Objective   Objective     Vital Signs:   Temp:  [98.7 øF (37.1 øC)-102.6 øF (39.2 øC)] 100 øF (37.8 øC)  Heart Rate:  [76-98] 98  Resp:  [12-18] 16  BP: (110-140)/(62-82) 115/73     Physical Exam:  NAD, alert and oriented  OP clear, MMM  Neck supple  No LAD  RRR  Decreased at bases  +BS, soft  COPPOLA  Normal affect  No rashes    Results Reviewed:  LAB RESULTS:      Lab 10/11/23  0425 10/10/23  1056   WBC 7.75 15.54*   HEMOGLOBIN 9.1* 11.4*   HEMATOCRIT 26.0* 32.3*   PLATELETS 140 160   NEUTROS ABS  --  14.02*   IMMATURE GRANS (ABS)  --  0.34*   LYMPHS ABS  --  0.71   MONOS ABS  --  0.45   EOS ABS  --  0.00   MCV 93.2 94.7   PROCALCITONIN  --  6.45*   LACTATE  --  2.1*         Lab 10/11/23  0425 10/10/23  1056   SODIUM 133* 132*   POTASSIUM 3.7 4.0   CHLORIDE 101 96*   CO2 25.0 25.0   ANION GAP 7.0 11.0   BUN 11 15   CREATININE 0.86 1.04   EGFR 99.1 82.2   GLUCOSE 135* 201*   CALCIUM 8.5* 9.2   MAGNESIUM  --  1.8   HEMOGLOBIN A1C  --  6.60*         Lab 10/10/23  1056   TOTAL PROTEIN 9.0*   ALBUMIN 3.7   GLOBULIN 5.3   ALT (SGPT) 27   AST (SGOT) 32   BILIRUBIN 1.7*   ALK PHOS 82         Lab 10/10/23  1056   HSTROP T 12                 Brief Urine Lab Results  (Last result in the past 365 days)        Color   Clarity   Blood   Leuk Est   Nitrite   Protein   CREAT   Urine HCG        10/10/23 1157 Yellow   Clear   Moderate (2+)   Negative   Negative   100 mg/dL (2+)                   Microbiology Results Abnormal       Procedure Component Value - Date/Time    MRSA Screen, PCR (Inpatient) - Swab, Nares [[id number]]  (Normal) Collected: 10/10/23 9293    Lab Status: Final result Specimen: Swab from Nares  Updated: 10/10/23 1759     MRSA PCR Negative    Narrative:      The negative predictive value of this diagnostic test is high and should only be used to consider de-escalating anti-MRSA therapy. A positive result may indicate colonization with MRSA and must be correlated clinically.  MRSA Negative            CT Angiogram Chest    Result Date: 10/10/2023  CT ANGIOGRAM CHEST, CT ABDOMEN PELVIS W CONTRAST Date of Exam: 10/10/2023 10:49 AM CDT Indication: pe. lung mass. hx of prostate cancer.. Comparison: CT abdomen pelvis 9/29/2021 Technique: CTA of the chest and CT abdomen and pelvis were performed after the uneventful intravenous administration of 85 cc Isovue-370. Reconstructed coronal and sagittal images were also obtained. In addition, a 3-D volume rendered image was created for interpretation. Automated exposure control and iterative reconstruction methods were used. Findings: CT PULMONARY ANGIOGRAM PULMONARY VASCULATURE: Pulmonary arteries are widely patent without evidence of embolus.Main pulmonary artery is normal in size. No evidence of right heart strain. MEDIASTINUM:Unremarkable. Aortic and heart size are normal. No aortic dissection identified. No mass nor pericardial effusion. CORONARY ARTERIES: Moderate calcified atherosclerotic disease. LUNGS: There is dense consolidation of the posterior right upper lobe consistent with pneumonia. Mild bronchial wall thickening leading into this region. Lungs are otherwise clear. No nodule identified. PLEURAL SPACE: No effusion, mass, nor pneumothorax. LYMPH NODES: There are no pathologically enlarged lymph nodes. CT ABDOMEN AND PELVIS LIVER:  Unremarkable parenchyma without focal lesion. BILIARY/GALLBLADDER:  Unremarkable SPLEEN:  Unremarkable PANCREAS:  Unremarkable ADRENAL:  Unremarkable KIDNEYS:  Unremarkable parenchyma with no solid mass identified. No obstruction.  No calculus identified. GASTROINTESTINAL/MESENTERY:  No evidence of obstruction nor inflammation.   Colonic diverticulosis. No evidence of acute diverticulitis. The appendix is normal. MESENTERIC VESSELS:  Patent. AORTA/IVC:  Normal caliber. RETROPERITONEUM/LYMPH NODES:  Unremarkable REPRODUCTIVE: Prostatectomy. There is a penile implant. BLADDER:  Unremarkable OSSEUS STRUCTURES: There are multiple spinal compression fractures with previous kyphoplasty of T6. There are likely old right-sided rib fractures. No definite suspicious bony lesion identified. If osseous metastatic disease is suspected, nuclear medicine bone scan might be useful.     Impression: Impression: 1.Posterior right upper lobe pneumonia. 2.No evidence of pulmonary embolism. 3.Other incidental nonemergent findings as detailed above. No definitive metastatic disease identified. Electronically Signed: Uri De Leon MD  10/10/2023 11:22 AM CDT  Workstation ID: [id number]    CT Abdomen Pelvis With Contrast    Result Date: 10/10/2023  CT ANGIOGRAM CHEST, CT ABDOMEN PELVIS W CONTRAST Date of Exam: 10/10/2023 10:49 AM CDT Indication: pe. lung mass. hx of prostate cancer.. Comparison: CT abdomen pelvis 9/29/2021 Technique: CTA of the chest and CT abdomen and pelvis were performed after the uneventful intravenous administration of 85 cc Isovue-370. Reconstructed coronal and sagittal images were also obtained. In addition, a 3-D volume rendered image was created for interpretation. Automated exposure control and iterative reconstruction methods were used. Findings: CT PULMONARY ANGIOGRAM PULMONARY VASCULATURE: Pulmonary arteries are widely patent without evidence of embolus.Main pulmonary artery is normal in size. No evidence of right heart strain. MEDIASTINUM:Unremarkable. Aortic and heart size are normal. No aortic dissection identified. No mass nor pericardial effusion. CORONARY ARTERIES: Moderate calcified atherosclerotic disease. LUNGS: There is dense consolidation of the posterior right upper lobe consistent with pneumonia. Mild bronchial wall  thickening leading into this region. Lungs are otherwise clear. No nodule identified. PLEURAL SPACE: No effusion, mass, nor pneumothorax. LYMPH NODES: There are no pathologically enlarged lymph nodes. CT ABDOMEN AND PELVIS LIVER:  Unremarkable parenchyma without focal lesion. BILIARY/GALLBLADDER:  Unremarkable SPLEEN:  Unremarkable PANCREAS:  Unremarkable ADRENAL:  Unremarkable KIDNEYS:  Unremarkable parenchyma with no solid mass identified. No obstruction.  No calculus identified. GASTROINTESTINAL/MESENTERY:  No evidence of obstruction nor inflammation.  Colonic diverticulosis. No evidence of acute diverticulitis. The appendix is normal. MESENTERIC VESSELS:  Patent. AORTA/IVC:  Normal caliber. RETROPERITONEUM/LYMPH NODES:  Unremarkable REPRODUCTIVE: Prostatectomy. There is a penile implant. BLADDER:  Unremarkable OSSEUS STRUCTURES: There are multiple spinal compression fractures with previous kyphoplasty of T6. There are likely old right-sided rib fractures. No definite suspicious bony lesion identified. If osseous metastatic disease is suspected, nuclear medicine bone scan might be useful.     Impression: Impression: 1.Posterior right upper lobe pneumonia. 2.No evidence of pulmonary embolism. 3.Other incidental nonemergent findings as detailed above. No definitive metastatic disease identified. Electronically Signed: Uri De Leon MD  10/10/2023 11:22 AM CDT  Workstation ID: [id number]    XR Chest 1 View    Result Date: 10/10/2023  XR CHEST 1 VW Date of Exam: 10/10/2023 10:46 AM EDT Indication: Weak/Dizzy/AMS triage protocol Comparison: 12/17/2022. Findings: There is a new large opacity of the right upper lobe extending into the right hilum. The left lung is clear. The heart size is normal. There are no definite effusions.     Impression: Impression: Large opacity on the right likely relates to pneumonia although underlying mass is not excluded and follow-up exam to clearing recommended. Electronically Signed:  Mervat Hooker MD  10/10/2023 11:06 AM EDT  Workstation ID: [id number]         Current medications:  Scheduled Meds:aspirin, 81 mg, Oral, Daily  atorvastatin, 10 mg, Oral, Nightly  enoxaparin, 40 mg, Subcutaneous, Daily  insulin lispro, 2-7 Units, Subcutaneous, 4x Daily AC & at Bedtime  Pancrelipase (Lip-Prot-Amyl), 36,000 units of lipase, Oral, TID With Meals  piperacillin-tazobactam, 3.375 g, Intravenous, Q8H  relugolix, 120 mg, Oral, Daily  senna-docusate sodium, 2 tablet, Oral, BID  sodium chloride, 10 mL, Intravenous, Q12H      Continuous Infusions:lactated ringers, 100 mL/hr, Last Rate: 100 mL/hr (10/11/23 0310)      PRN Meds:.  acetaminophen    senna-docusate sodium **AND** polyethylene glycol **AND** bisacodyl **AND** bisacodyl    Calcium Replacement - Follow Nurse / BPA Driven Protocol    dextrose    dextrose    glucagon (human recombinant)    HYDROcodone-acetaminophen    HYDROmorphone **AND** naloxone    Magnesium Standard Dose Replacement - Follow Nurse / BPA Driven Protocol    ondansetron **OR** ondansetron    Phosphorus Replacement - Follow Nurse / BPA Driven Protocol    Potassium Replacement - Follow Nurse / BPA Driven Protocol    sodium chloride    sodium chloride    sodium chloride    Assessment & Plan   Assessment & Plan     Active Hospital Problems    Diagnosis  POA    **Sepsis [A41.9]  Yes    PNA (pneumonia) [J18.9]  Yes    Chronic nausea [R11.0]  Yes    Prostate cancer [C61]  Yes    Diabetes mellitus [E11.9]  Yes      Resolved Hospital Problems   No resolved problems to display.        Brief Hospital Course to date:  Julian Rodriguez is a 58 y.o. male here with sepsis    Sepsis  Strep pneumonia bacteremia  PNA  -IV abx  -consult [facility] for outpatient IV abx    +Rhinovirus  -supportive care    Leukocytosis  -resolved    Metastatic prostate cancer  On GnRH    DM  -SSI        Expected Discharge Location and Transportation: Home on IV abx  Expected Discharge   Expected Discharge Date:  10/12/2023; Expected Discharge Time:      DVT prophylaxis:  Medical DVT prophylaxis orders are present.     AM-PAC 6 Clicks Score (PT): 19 (10/10/23 1600)    CODE STATUS:   Code Status and Medical Interventions:   Ordered at: 10/10/23 1413     Level Of Support Discussed With:    Patient     Code Status (Patient has no pulse and is not breathing):    CPR (Attempt to Resuscitate)     Medical Interventions (Patient has pulse or is breathing):    Full Support       Wilmer Melara MD  10/11/23

## 2023-10-11 NOTE — PLAN OF CARE
Goal Outcome Evaluation:  Plan of Care Reviewed With: patient        Progress: improving  Outcome Evaluation: A/O x4, had one episode of nausea this shift without vomiting. Chronic back pain treated effectively with PRN meds. VSS. Temp elevated beginning of shift. NSR, remains on room air. Will continue with plan of care.

## 2023-10-11 NOTE — CASE MANAGEMENT/SOCIAL WORK
Continued Stay Note   Jorge A     Patient Name: Julian Rodriguez  MRN: [id number]  Today's Date: 10/11/2023    Admit Date: 10/10/2023    Plan: Home   Discharge Plan       Row Name 10/11/23 1634       Plan    Plan Home    Patient/Family in Agreement with Plan yes    Plan Comments Discussed patient in MDR. ID consulted today, 10/11. Spoke to patient at bedside. Patient's plan is either home with IV antibiotics vs daily in office infusion therapy. Will await ID recommendations. CM will continue to follow and assist.    Final Discharge Disposition Code 01 - home or self-care                   Discharge Codes    No documentation.                 Expected Discharge Date and Time       Expected Discharge Date Expected Discharge Time    Oct 13, 2023               Lorena Marie RN

## 2023-10-12 ENCOUNTER — APPOINTMENT (OUTPATIENT)
Dept: GENERAL RADIOLOGY | Facility: HOSPITAL | Age: 58
DRG: 871 | End: 2023-10-12
Payer: MEDICARE

## 2023-10-12 VITALS
BODY MASS INDEX: 29.03 KG/M2 | OXYGEN SATURATION: 96 % | TEMPERATURE: 97.9 F | HEART RATE: 63 BPM | HEIGHT: 69 IN | DIASTOLIC BLOOD PRESSURE: 82 MMHG | WEIGHT: 196 LBS | SYSTOLIC BLOOD PRESSURE: 145 MMHG | RESPIRATION RATE: 16 BRPM

## 2023-10-12 PROBLEM — E44.0 MODERATE MALNUTRITION: Status: ACTIVE | Noted: 2023-10-12

## 2023-10-12 PROBLEM — J15.4 STREPTOCOCCAL PNEUMONIA: Status: ACTIVE | Noted: 2023-10-12

## 2023-10-12 LAB
ALBUMIN SERPL-MCNC: 2.6 G/DL (ref 3.5–5.2)
ALBUMIN/GLOB SERPL: 0.6 G/DL
ALP SERPL-CCNC: 104 U/L (ref 39–117)
ALT SERPL W P-5'-P-CCNC: 71 U/L (ref 1–41)
ANION GAP SERPL CALCULATED.3IONS-SCNC: 5 MMOL/L (ref 5–15)
AST SERPL-CCNC: 66 U/L (ref 1–40)
BILIRUB SERPL-MCNC: 0.7 MG/DL (ref 0–1.2)
BUN SERPL-MCNC: 9 MG/DL (ref 8–23)
BUN/CREAT SERPL: 10.7 (ref 7–25)
CALCIUM SPEC-SCNC: 8.2 MG/DL (ref 8.6–10.5)
CHLORIDE SERPL-SCNC: 102 MMOL/L (ref 98–107)
CO2 SERPL-SCNC: 25 MMOL/L (ref 22–29)
CREAT SERPL-MCNC: 0.84 MG/DL (ref 0.76–1.27)
DEPRECATED RDW RBC AUTO: 44.3 FL (ref 37–54)
EGFRCR SERPLBLD CKD-EPI 2021: 99.8 ML/MIN/1.73
ERYTHROCYTE [DISTWIDTH] IN BLOOD BY AUTOMATED COUNT: 12.8 % (ref 12.3–15.4)
GLOBULIN UR ELPH-MCNC: 4.2 GM/DL
GLUCOSE BLDC GLUCOMTR-MCNC: 132 MG/DL (ref 70–130)
GLUCOSE BLDC GLUCOMTR-MCNC: 238 MG/DL (ref 70–130)
GLUCOSE SERPL-MCNC: 152 MG/DL (ref 65–99)
HCT VFR BLD AUTO: 25 % (ref 37.5–51)
HGB BLD-MCNC: 8.7 G/DL (ref 13–17.7)
MCH RBC QN AUTO: 32.7 PG (ref 26.6–33)
MCHC RBC AUTO-ENTMCNC: 34.8 G/DL (ref 31.5–35.7)
MCV RBC AUTO: 94 FL (ref 79–97)
PLATELET # BLD AUTO: 149 10*3/MM3 (ref 140–450)
PMV BLD AUTO: 10.2 FL (ref 6–12)
POTASSIUM SERPL-SCNC: 3.3 MMOL/L (ref 3.5–5.2)
PROT SERPL-MCNC: 6.8 G/DL (ref 6–8.5)
RBC # BLD AUTO: 2.66 10*6/MM3 (ref 4.14–5.8)
SODIUM SERPL-SCNC: 132 MMOL/L (ref 136–145)
WBC NRBC COR # BLD: 4.23 10*3/MM3 (ref 3.4–10.8)

## 2023-10-12 PROCEDURE — 71045 X-RAY EXAM CHEST 1 VIEW: CPT

## 2023-10-12 PROCEDURE — 25810000003 LACTATED RINGERS PER 1000 ML: Performed by: INTERNAL MEDICINE

## 2023-10-12 PROCEDURE — 85027 COMPLETE CBC AUTOMATED: CPT | Performed by: HOSPITALIST

## 2023-10-12 PROCEDURE — 25010000002 ENOXAPARIN PER 10 MG: Performed by: INTERNAL MEDICINE

## 2023-10-12 PROCEDURE — 80053 COMPREHEN METABOLIC PANEL: CPT | Performed by: HOSPITALIST

## 2023-10-12 PROCEDURE — 82948 REAGENT STRIP/BLOOD GLUCOSE: CPT

## 2023-10-12 PROCEDURE — 25010000002 CEFTRIAXONE PER 250 MG: Performed by: INTERNAL MEDICINE

## 2023-10-12 RX ORDER — POTASSIUM CHLORIDE 20 MEQ/1
40 TABLET, EXTENDED RELEASE ORAL EVERY 4 HOURS
Status: DISCONTINUED | OUTPATIENT
Start: 2023-10-12 | End: 2023-10-12 | Stop reason: HOSPADM

## 2023-10-12 RX ADMIN — POTASSIUM CHLORIDE 40 MEQ: 1500 TABLET, EXTENDED RELEASE ORAL at 08:47

## 2023-10-12 RX ADMIN — ENOXAPARIN SODIUM 40 MG: 100 INJECTION SUBCUTANEOUS at 08:48

## 2023-10-12 RX ADMIN — CEFTRIAXONE SODIUM 2000 MG: 2 INJECTION, POWDER, FOR SOLUTION INTRAMUSCULAR; INTRAVENOUS at 10:18

## 2023-10-12 RX ADMIN — PANCRELIPASE 36000 UNITS OF LIPASE: 36000; 180000; 114000 CAPSULE, DELAYED RELEASE PELLETS ORAL at 08:47

## 2023-10-12 RX ADMIN — HYDROCODONE BITARTRATE AND ACETAMINOPHEN 1 TABLET: 5; 325 TABLET ORAL at 08:51

## 2023-10-12 RX ADMIN — ASPIRIN 81 MG: 81 TABLET, COATED ORAL at 08:47

## 2023-10-12 RX ADMIN — HYDROCODONE BITARTRATE AND ACETAMINOPHEN 1 TABLET: 5; 325 TABLET ORAL at 01:54

## 2023-10-12 RX ADMIN — Medication 10 ML: at 08:48

## 2023-10-12 RX ADMIN — SODIUM CHLORIDE, POTASSIUM CHLORIDE, SODIUM LACTATE AND CALCIUM CHLORIDE 100 ML/HR: 600; 310; 30; 20 INJECTION, SOLUTION INTRAVENOUS at 01:54

## 2023-10-12 NOTE — PROGRESS NOTES
INFECTIOUS DISEASE Progress Note    Julian Keeping  1965  [id number]      Admission Date: 10/10/2023      Requesting Provider: No ref. provider found  Evaluating Physician: Pedro Sanchez MD    Reason for Consultation: Severe sepsis/pneumococcal bacteremia/pneumococcal pneumonia    History of present illness:    10/11/23: Patient is a 58 y.o. male With prostate cancer who is seen today for evaluation of severe sepsis secondary to bacteremic pneumococcal pneumonia.  On Saturday evening he developed malaise, nausea, vomiting, fevers to 102ø, and shaking chills.  He complained of a cough with yellowish sputum production.  He was exposed to a granddaughter with RSV and thought that he probably had RSV infection. He presented to his primary care provider's office on Monday and was instructed to go to the emergency room. In the ER he was noted to have a pro-calcitonin of 6.5, lactic acid of 2.1,And white blood cell count of 15.5.A chest CT scan revealed a lobar right posterior upper lobe pneumonia.  A respiratory virus panel PCR was positive for rhinovirus.He was admitted to the hospital by Dr. Cristina who is concerned about a superimposed bacterial infection due to his lobar pneumonia and rigors.  He was started on intravenous Zosyn therapy.  Nasal MRSA PCR was negative.  Blood cultures are now positive for pneumococcus. His maximum fever since admission is 102.6ø. Shaking chills have improved.He currently feels better.  He denies pleuritic chest pain.    10/12/23: His maximum temperature over the last 24 hours is 99.3. He had a rough night with being frequently woken up.  This morning he denies increased cough and dyspnea.  He denies nausea, vomiting, diarrhea.  He denies pleuritic chest pain and hemoptysis.  He would like to go home.    Past Medical History:   Diagnosis Date    Diabetes mellitus     Hyperlipidemia     Hypertension     Prostate cancer     Umbilical hernia        Past Surgical History:    Procedure Laterality Date    BLADDER SLING MODIFIED, ANTERIOR AND POSTERIOR VAGINAL REPAIR      ENDOSCOPY N/A 12/19/2022    Procedure: ESOPHAGOGASTRODUODENOSCOPY;  Surgeon: Brunner, Mark I, MD;  Location: [facility] ENDOSCOPY;  Service: Gastroenterology;  Laterality: N/A;    PENILE PROSTHESIS IMPLANT      PROSTATECTOMY      ROTATOR CUFF REPAIR         Family History   Problem Relation Age of Onset    Cancer Mother     Cancer Father     Cancer Maternal Grandmother     Cancer Paternal Grandfather        Social History     Socioeconomic History    Marital status:    Tobacco Use    Smoking status: Never    Smokeless tobacco: Never   Vaping Use    Vaping Use: Never used   Substance and Sexual Activity    Alcohol use: Not Currently     Alcohol/week: 6.0 standard drinks of alcohol     Types: 6 Glasses of wine per week    Drug use: Never    Sexual activity: Defer       No Known Allergies      Medication:    Current Facility-Administered Medications:     acetaminophen (TYLENOL) tablet 650 mg, 650 mg, Oral, Q4H PRN, Kirstie Cristina MD, 650 mg at 10/10/23 1629    aspirin EC tablet 81 mg, 81 mg, Oral, Daily, Kirstie Cristina MD, 81 mg at 10/11/23 0904    atorvastatin (LIPITOR) tablet 10 mg, 10 mg, Oral, Nightly, Kirstie Cristina MD, 10 mg at 10/10/23 2001    sennosides-docusate (PERICOLACE) 8.6-50 MG per tablet 2 tablet, 2 tablet, Oral, BID **AND** polyethylene glycol (MIRALAX) packet 17 g, 17 g, Oral, Daily PRN **AND** bisacodyl (DULCOLAX) EC tablet 5 mg, 5 mg, Oral, Daily PRN **AND** bisacodyl (DULCOLAX) suppository 10 mg, 10 mg, Rectal, Daily PRN, Kirstie Cristina MD    Calcium Replacement - Follow Nurse / BPA Driven Protocol, , Does not apply, PRN, Kirstie Cristina MD    cefTRIAXone (ROCEPHIN) 2000 mg/100 mL 0.9% NS IVPB (MBP), 2,000 mg, Intravenous, Q24H, Pedro Sanchez MD, 2,000 mg at 10/11/23 0903    dextrose (D50W) (25 g/50 mL) IV injection 25 g, 25 g, Intravenous, Q15 Min PRN, Kirstie Cristina MD    dextrose (GLUTOSE)  oral gel 15 g, 15 g, Oral, Q15 Min PRN, Kirstie Cristina MD    Enoxaparin Sodium (LOVENOX) syringe 40 mg, 40 mg, Subcutaneous, Daily, Kirstie Cristina MD, 40 mg at 10/11/23 0904    glucagon (GLUCAGEN) injection 1 mg, 1 mg, Intramuscular, Q15 Min PRN, Kirstie Cristina MD    HYDROcodone-acetaminophen (NORCO) 5-325 MG per tablet 1 tablet, 1 tablet, Oral, Q4H PRN, Kirstie Cristina MD, 1 tablet at 10/12/23 0154    HYDROmorphone (DILAUDID) injection 0.25 mg, 0.25 mg, Intravenous, Q4H PRN, 0.25 mg at 10/11/23 2243 **AND** naloxone (NARCAN) injection 0.4 mg, 0.4 mg, Intravenous, Q5 Min PRN, Kirstie Cristina MD    Insulin Lispro (humaLOG) injection 2-7 Units, 2-7 Units, Subcutaneous, 4x Daily AC & at Bedtime, Kirstie Cristina MD, 2 Units at 10/11/23 1738    lactated ringers infusion, 100 mL/hr, Intravenous, Continuous, Kirstie Cristina MD, Last Rate: 100 mL/hr at 10/12/23 0154, 100 mL/hr at 10/12/23 0154    Magnesium Standard Dose Replacement - Follow Nurse / BPA Driven Protocol, , Does not apply, PRALBARO, Kirstie Cristina MD    ondansetron (ZOFRAN) tablet 4 mg, 4 mg, Oral, Q6H PRN **OR** ondansetron (ZOFRAN) injection 4 mg, 4 mg, Intravenous, Q6H PRN, Kirstie Cristina MD, 4 mg at 10/11/23 0537    Pancrelipase (Lip-Prot-Amyl) (CREON) capsule 36,000 units of lipase, 36,000 units of lipase, Oral, TID With Meals, Kirstie Cristina MD, 36,000 units of lipase at 10/11/23 1738    Phosphorus Replacement - Follow Nurse / BPA Driven Protocol, , Does not apply, PRN, Kirstie Cristina MD    Potassium Replacement - Follow Nurse / BPA Driven Protocol, , Does not apply, PRN, Kirstie Cristina MD    relugolix (ORGOVYX) tablet 120 mg **PATIENT SUPPLIED, 120 mg, Oral, Daily, Kirstie Cristina MD    sodium chloride 0.9 % flush 10 mL, 10 mL, Intravenous, PRN, Kirstie Cristina MD    sodium chloride 0.9 % flush 10 mL, 10 mL, Intravenous, Q12H, Kirstie Cristina MD, 10 mL at 10/11/23 0905    sodium chloride 0.9 % flush 10 mL, 10 mL, Intravenous, PRN, Kirstie Cristina MD    sodium  chloride 0.9 % infusion 40 mL, 40 mL, Intravenous, PRN, Kirstie Cristina MD    Antibiotics:  Anti-Infectives (From admission, onward)      Ordered     Dose/Rate Route Frequency Start Stop    10/11/23 0824  cefTRIAXone (ROCEPHIN) 2000 mg/100 mL 0.9% NS IVPB (MBP)        Ordering Provider: Pedro Sanchez MD    2,000 mg  over 30 Minutes Intravenous Every 24 Hours 10/11/23 1000 10/21/23 0959    10/10/23 1209  piperacillin-tazobactam (ZOSYN) 3.375 g in iso-osmotic dextrose 50 ml (premix)        Ordering Provider: Edgardo Mejia APRN    3.375 g  over 30 Minutes Intravenous Once 10/10/23 1225 10/10/23 1313              Review of Systems:  See HPI      Physical Exam:   Vital Signs  Temp (24hrs), Av.9 øF (37.2 øC), Min:98.1 øF (36.7 øC), Max:99.6 øF (37.6 øC)    Temp  Min: 98.1 øF (36.7 øC)  Max: 99.6 øF (37.6 øC)  BP  Min: 111/67  Max: 136/79  Pulse  Min: 63  Max: 80  Resp  Min: 16  Max: 16  SpO2  Min: 94 %  Max: 98 %    GENERAL: Awake and alert, in no acute distress.   HEENT: Normocephalic, atraumatic.  PERRL. EOMI. No conjunctival injection. No icterus. Oropharynx clear without evidence of thrush or exudate  NECK: Supple   HEART: RRR; No murmur, rubs, gallops.   LUNGS: Mild right-sided crackles..  ABDOMEN: Soft, nontender, nondistended. . No rebound or guarding. NO mass or HSM.  EXT:  No cyanosis, clubbing or edema. No cord.  :  Without Shepherd catheter.  MSK: No joint effusions or erythema  SKIN: Warm and dry without cutaneous eruptions on Inspection/palpation.    NEURO: Oriented to PPT.  Motor 5/5 strength  PSYCHIATRIC: Normal insight and judgment. Cooperative with PE    Laboratory Data    Results from last 7 days   Lab Units 10/11/23  0425 10/10/23  1056   WBC 10*3/mm3 7.75 15.54*   HEMOGLOBIN g/dL 9.1* 11.4*   HEMATOCRIT % 26.0* 32.3*   PLATELETS 10*3/mm3 140 160     Results from last 7 days   Lab Units 10/11/23  0425   SODIUM mmol/L 133*   POTASSIUM mmol/L 3.7   CHLORIDE mmol/L 101   CO2 mmol/L 25.0   BUN mg/dL  "11   CREATININE mg/dL 0.86   GLUCOSE mg/dL 135*   CALCIUM mg/dL 8.5*     Results from last 7 days   Lab Units 10/10/23  1056   ALK PHOS U/L 82   BILIRUBIN mg/dL 1.7*   ALT (SGPT) U/L 27   AST (SGOT) U/L 32             Results from last 7 days   Lab Units 10/10/23  1056   LACTATE mmol/L 2.1*             Estimated Creatinine Clearance: 100.8 mL/min (by C-G formula based on SCr of 0.86 mg/dL).      Microbiology:  Blood Culture   Date Value Ref Range Status   10/10/2023 Abnormal Stain (C)  Preliminary     BCID, PCR   Date Value Ref Range Status   10/10/2023 (A) Negative by BCID PCR. Culture to Follow. Final    Streptococcus pneumoniae. Identification by BCID2 PCR.     No results found for: \"CULTURES\", \"HSVCX\", \"URCX\"  No results found for: \"EYECULTURE\", \"GCCX\", \"HSVCULTURE\", \"LABHSV\"  No results found for: \"LEGIONELLA\", \"MRSACX\", \"MUMPSCX\", \"MYCOPLASCX\"  No results found for: \"NOCARDIACX\", \"STOOLCX\"  No results found for: \"THROATCX\", \"UNSTIMCULT\", \"URINECX\", \"CULTURE\", \"VZVCULTUR\"  No results found for: \"VIRALCULTU\", \"WOUNDCX\"        Radiology:  Imaging Results (Last 72 Hours)       Procedure Component Value Units Date/Time    XR Chest 1 View [[id number]] Resulted: 10/12/23 0412     Updated: 10/12/23 0413    CT Angiogram Chest [[id number]] Collected: 10/10/23 1212     Updated: 10/10/23 1225    Narrative:      CT ANGIOGRAM CHEST, CT ABDOMEN PELVIS W CONTRAST    Date of Exam: 10/10/2023 10:49 AM CDT    Indication: pe. lung mass. hx of prostate cancer..    Comparison: CT abdomen pelvis 9/29/2021    Technique: CTA of the chest and CT abdomen and pelvis were performed after the uneventful intravenous administration of 85 cc Isovue-370. Reconstructed coronal and sagittal images were also obtained. In addition, a 3-D volume rendered image was created   for interpretation. Automated exposure control and iterative reconstruction methods were used.      Findings:    CT PULMONARY ANGIOGRAM   PULMONARY VASCULATURE: Pulmonary arteries " are widely patent without evidence of embolus.Main pulmonary artery is normal in size. No evidence of right heart strain.    MEDIASTINUM:Unremarkable. Aortic and heart size are normal. No aortic dissection identified. No mass nor pericardial effusion.  CORONARY ARTERIES: Moderate calcified atherosclerotic disease.  LUNGS: There is dense consolidation of the posterior right upper lobe consistent with pneumonia. Mild bronchial wall thickening leading into this region. Lungs are otherwise clear. No nodule identified.  PLEURAL SPACE: No effusion, mass, nor pneumothorax.  LYMPH NODES: There are no pathologically enlarged lymph nodes.      CT ABDOMEN AND PELVIS  LIVER:  Unremarkable parenchyma without focal lesion.  BILIARY/GALLBLADDER:  Unremarkable  SPLEEN:  Unremarkable  PANCREAS:  Unremarkable  ADRENAL:  Unremarkable  KIDNEYS:  Unremarkable parenchyma with no solid mass identified. No obstruction.  No calculus identified.  GASTROINTESTINAL/MESENTERY:  No evidence of obstruction nor inflammation.  Colonic diverticulosis. No evidence of acute diverticulitis. The appendix is normal.  MESENTERIC VESSELS:  Patent.  AORTA/IVC:  Normal caliber.    RETROPERITONEUM/LYMPH NODES:  Unremarkable    REPRODUCTIVE: Prostatectomy. There is a penile implant.  BLADDER:  Unremarkable    OSSEUS STRUCTURES: There are multiple spinal compression fractures with previous kyphoplasty of T6. There are likely old right-sided rib fractures. No definite suspicious bony lesion identified. If osseous metastatic disease is suspected, nuclear   medicine bone scan might be useful.        Impression:      Impression:  1.Posterior right upper lobe pneumonia.  2.No evidence of pulmonary embolism.  3.Other incidental nonemergent findings as detailed above. No definitive metastatic disease identified.        Electronically Signed: Uri De Leon MD    10/10/2023 11:22 AM CDT    Workstation ID: [id number]    CT Abdomen Pelvis With Contrast [[id number]]  Collected: 10/10/23 1212     Updated: 10/10/23 1225    Narrative:      CT ANGIOGRAM CHEST, CT ABDOMEN PELVIS W CONTRAST    Date of Exam: 10/10/2023 10:49 AM CDT    Indication: pe. lung mass. hx of prostate cancer..    Comparison: CT abdomen pelvis 9/29/2021    Technique: CTA of the chest and CT abdomen and pelvis were performed after the uneventful intravenous administration of 85 cc Isovue-370. Reconstructed coronal and sagittal images were also obtained. In addition, a 3-D volume rendered image was created   for interpretation. Automated exposure control and iterative reconstruction methods were used.      Findings:    CT PULMONARY ANGIOGRAM   PULMONARY VASCULATURE: Pulmonary arteries are widely patent without evidence of embolus.Main pulmonary artery is normal in size. No evidence of right heart strain.    MEDIASTINUM:Unremarkable. Aortic and heart size are normal. No aortic dissection identified. No mass nor pericardial effusion.  CORONARY ARTERIES: Moderate calcified atherosclerotic disease.  LUNGS: There is dense consolidation of the posterior right upper lobe consistent with pneumonia. Mild bronchial wall thickening leading into this region. Lungs are otherwise clear. No nodule identified.  PLEURAL SPACE: No effusion, mass, nor pneumothorax.  LYMPH NODES: There are no pathologically enlarged lymph nodes.      CT ABDOMEN AND PELVIS  LIVER:  Unremarkable parenchyma without focal lesion.  BILIARY/GALLBLADDER:  Unremarkable  SPLEEN:  Unremarkable  PANCREAS:  Unremarkable  ADRENAL:  Unremarkable  KIDNEYS:  Unremarkable parenchyma with no solid mass identified. No obstruction.  No calculus identified.  GASTROINTESTINAL/MESENTERY:  No evidence of obstruction nor inflammation.  Colonic diverticulosis. No evidence of acute diverticulitis. The appendix is normal.  MESENTERIC VESSELS:  Patent.  AORTA/IVC:  Normal caliber.    RETROPERITONEUM/LYMPH NODES:  Unremarkable    REPRODUCTIVE: Prostatectomy. There is a penile  implant.  BLADDER:  Unremarkable    OSSEUS STRUCTURES: There are multiple spinal compression fractures with previous kyphoplasty of T6. There are likely old right-sided rib fractures. No definite suspicious bony lesion identified. If osseous metastatic disease is suspected, nuclear   medicine bone scan might be useful.        Impression:      Impression:  1.Posterior right upper lobe pneumonia.  2.No evidence of pulmonary embolism.  3.Other incidental nonemergent findings as detailed above. No definitive metastatic disease identified.        Electronically Signed: Uri De Leon MD    10/10/2023 11:22 AM CDT    Workstation ID: [id number]    XR Chest 1 View [[id number]] Collected: 10/10/23 1105     Updated: 10/10/23 1109    Narrative:      XR CHEST 1 VW    Date of Exam: 10/10/2023 10:46 AM EDT    Indication: Weak/Dizzy/AMS triage protocol    Comparison: 12/17/2022.    Findings:  There is a new large opacity of the right upper lobe extending into the right hilum. The left lung is clear. The heart size is normal. There are no definite effusions.      Impression:      Impression:  Large opacity on the right likely relates to pneumonia although underlying mass is not excluded and follow-up exam to clearing recommended.      Electronically Signed: Mervat Hooker MD    10/10/2023 11:06 AM EDT    Workstation ID: [id number]        I read his chest x-ray images of 10/12 which revealed a persistent right upper lobe lobar pneumonia.  I reviewed the images with the patient and his friend.      Impression:   Severe sepsis-manifested by lactic acidosis, leukocytosis/neutrophilia,uncontrolled diabetes mellitus, an elevated pro-calcitonin, fever to 102.6ø, and a known focus of infection with bacteremic pneumococcal pneumonia.  Pneumococcal right upper lobe lobar pneumonia-Stable/improved on intravenous antibiotic therapy.  Pneumococcal bacteremia  Rhinovirus URI  Metastatic prostate cancer  Leukocytosis/neutrophilia-improving  Type 2  diabetes mellitus-this places him at increased risk for recurrent infection    PLAN/RECOMMENDATIONS:   Ceftriaxone 2 g IV daily-he will be given a dose this morning prior to discharge.  Prevnar 20 pneumococcal vaccine after he is clinically improved  Discharge to home today  Acute outpatient care with IV ceftriaxone in our office starting 10/13  Appt. to see me Friday 10/13 at 1200- this has been made     He is clinically improving and I will plan to transition him to acute outpatient care in our office this morning.  He will receive a dose of intravenous ceftriaxone prior to discharge and I will plan to see him in follow-up in the office tomorrow in lieu of ongoing hospitalization.  I discussed his complex situation in detail with him and his friend today.  I discussed his complex situation and disposition with Dr. Pedro Melara and the nursing staff today.  I coordinated his care.  His high complexity medical problems required high complexity medical decision making today  I spent over 40 minutes on his complex care today.       Pedro Sanchez MD  10/12/2023  06:38 EDT

## 2023-10-12 NOTE — PROGRESS NOTES
[facility] Medicine Services  PROGRESS NOTE    Patient Name: Julian Rodriguez  : 1965  MRN: [id number]    Date of Admission: 10/10/2023  Primary Care Physician: Aaliyah Dawson MD    Subjective   Subjective     CC: Dyspnea    HPI: +F/C/sweats. Cough/congestion/pleurisy. Achy/tired, slept poorly. Not sure if ready to go home yet.      Objective   Objective     Vital Signs:   Temp:  [98.1 øF (36.7 øC)-99.3 øF (37.4 øC)] 98.1 øF (36.7 øC)  Heart Rate:  [63-80] 64  Resp:  [16] 16  BP: (125-136)/(73-82) 136/79     Physical Exam:  NAD, alert and oriented  OP clear, MMM  Neck supple  No LAD  RRR  Decreased at bases  +BS, soft  COPPOLA  Normal affect  No rashes  No change from 10/11 otherwise    Results Reviewed:  LAB RESULTS:      Lab 10/12/23  0406 10/11/23  0425 10/10/23  1056   WBC 4.23 7.75 15.54*   HEMOGLOBIN 8.7* 9.1* 11.4*   HEMATOCRIT 25.0* 26.0* 32.3*   PLATELETS 149 140 160   NEUTROS ABS  --   --  14.02*   IMMATURE GRANS (ABS)  --   --  0.34*   LYMPHS ABS  --   --  0.71   MONOS ABS  --   --  0.45   EOS ABS  --   --  0.00   MCV 94.0 93.2 94.7   PROCALCITONIN  --   --  6.45*   LACTATE  --   --  2.1*         Lab 10/12/23  0406 10/11/23  0425 10/10/23  1056   SODIUM 132* 133* 132*   POTASSIUM 3.3* 3.7 4.0   CHLORIDE 102 101 96*   CO2 25.0 25.0 25.0   ANION GAP 5.0 7.0 11.0   BUN 9 11 15   CREATININE 0.84 0.86 1.04   EGFR 99.8 99.1 82.2   GLUCOSE 152* 135* 201*   CALCIUM 8.2* 8.5* 9.2   MAGNESIUM  --   --  1.8   HEMOGLOBIN A1C  --   --  6.60*         Lab 10/12/23  0406 10/10/23  1056   TOTAL PROTEIN 6.8 9.0*   ALBUMIN 2.6* 3.7   GLOBULIN 4.2 5.3   ALT (SGPT) 71* 27   AST (SGOT) 66* 32   BILIRUBIN 0.7 1.7*   ALK PHOS 104 82         Lab 10/10/23  1056   HSTROP T 12                 Brief Urine Lab Results  (Last result in the past 365 days)        Color   Clarity   Blood   Leuk Est   Nitrite   Protein   CREAT   Urine HCG        10/10/23 1157 Yellow   Clear   Moderate (2+)   Negative    Negative   100 mg/dL (2+)                   Microbiology Results Abnormal       Procedure Component Value - Date/Time    MRSA Screen, PCR (Inpatient) - Swab, Nares [[id number]]  (Normal) Collected: 10/10/23 1628    Lab Status: Final result Specimen: Swab from Nares Updated: 10/10/23 1759     MRSA PCR Negative    Narrative:      The negative predictive value of this diagnostic test is high and should only be used to consider de-escalating anti-MRSA therapy. A positive result may indicate colonization with MRSA and must be correlated clinically.  MRSA Negative            CT Angiogram Chest    Result Date: 10/10/2023  CT ANGIOGRAM CHEST, CT ABDOMEN PELVIS W CONTRAST Date of Exam: 10/10/2023 10:49 AM CDT Indication: pe. lung mass. hx of prostate cancer.. Comparison: CT abdomen pelvis 9/29/2021 Technique: CTA of the chest and CT abdomen and pelvis were performed after the uneventful intravenous administration of 85 cc Isovue-370. Reconstructed coronal and sagittal images were also obtained. In addition, a 3-D volume rendered image was created for interpretation. Automated exposure control and iterative reconstruction methods were used. Findings: CT PULMONARY ANGIOGRAM PULMONARY VASCULATURE: Pulmonary arteries are widely patent without evidence of embolus.Main pulmonary artery is normal in size. No evidence of right heart strain. MEDIASTINUM:Unremarkable. Aortic and heart size are normal. No aortic dissection identified. No mass nor pericardial effusion. CORONARY ARTERIES: Moderate calcified atherosclerotic disease. LUNGS: There is dense consolidation of the posterior right upper lobe consistent with pneumonia. Mild bronchial wall thickening leading into this region. Lungs are otherwise clear. No nodule identified. PLEURAL SPACE: No effusion, mass, nor pneumothorax. LYMPH NODES: There are no pathologically enlarged lymph nodes. CT ABDOMEN AND PELVIS LIVER:  Unremarkable parenchyma without focal lesion. BILIARY/GALLBLADDER:   Unremarkable SPLEEN:  Unremarkable PANCREAS:  Unremarkable ADRENAL:  Unremarkable KIDNEYS:  Unremarkable parenchyma with no solid mass identified. No obstruction.  No calculus identified. GASTROINTESTINAL/MESENTERY:  No evidence of obstruction nor inflammation.  Colonic diverticulosis. No evidence of acute diverticulitis. The appendix is normal. MESENTERIC VESSELS:  Patent. AORTA/IVC:  Normal caliber. RETROPERITONEUM/LYMPH NODES:  Unremarkable REPRODUCTIVE: Prostatectomy. There is a penile implant. BLADDER:  Unremarkable OSSEUS STRUCTURES: There are multiple spinal compression fractures with previous kyphoplasty of T6. There are likely old right-sided rib fractures. No definite suspicious bony lesion identified. If osseous metastatic disease is suspected, nuclear medicine bone scan might be useful.     Impression: Impression: 1.Posterior right upper lobe pneumonia. 2.No evidence of pulmonary embolism. 3.Other incidental nonemergent findings as detailed above. No definitive metastatic disease identified. Electronically Signed: Uri De Leon MD  10/10/2023 11:22 AM CDT  Workstation ID: [id number]    CT Abdomen Pelvis With Contrast    Result Date: 10/10/2023  CT ANGIOGRAM CHEST, CT ABDOMEN PELVIS W CONTRAST Date of Exam: 10/10/2023 10:49 AM CDT Indication: pe. lung mass. hx of prostate cancer.. Comparison: CT abdomen pelvis 9/29/2021 Technique: CTA of the chest and CT abdomen and pelvis were performed after the uneventful intravenous administration of 85 cc Isovue-370. Reconstructed coronal and sagittal images were also obtained. In addition, a 3-D volume rendered image was created for interpretation. Automated exposure control and iterative reconstruction methods were used. Findings: CT PULMONARY ANGIOGRAM PULMONARY VASCULATURE: Pulmonary arteries are widely patent without evidence of embolus.Main pulmonary artery is normal in size. No evidence of right heart strain. MEDIASTINUM:Unremarkable. Aortic and heart size are  normal. No aortic dissection identified. No mass nor pericardial effusion. CORONARY ARTERIES: Moderate calcified atherosclerotic disease. LUNGS: There is dense consolidation of the posterior right upper lobe consistent with pneumonia. Mild bronchial wall thickening leading into this region. Lungs are otherwise clear. No nodule identified. PLEURAL SPACE: No effusion, mass, nor pneumothorax. LYMPH NODES: There are no pathologically enlarged lymph nodes. CT ABDOMEN AND PELVIS LIVER:  Unremarkable parenchyma without focal lesion. BILIARY/GALLBLADDER:  Unremarkable SPLEEN:  Unremarkable PANCREAS:  Unremarkable ADRENAL:  Unremarkable KIDNEYS:  Unremarkable parenchyma with no solid mass identified. No obstruction.  No calculus identified. GASTROINTESTINAL/MESENTERY:  No evidence of obstruction nor inflammation.  Colonic diverticulosis. No evidence of acute diverticulitis. The appendix is normal. MESENTERIC VESSELS:  Patent. AORTA/IVC:  Normal caliber. RETROPERITONEUM/LYMPH NODES:  Unremarkable REPRODUCTIVE: Prostatectomy. There is a penile implant. BLADDER:  Unremarkable OSSEUS STRUCTURES: There are multiple spinal compression fractures with previous kyphoplasty of T6. There are likely old right-sided rib fractures. No definite suspicious bony lesion identified. If osseous metastatic disease is suspected, nuclear medicine bone scan might be useful.     Impression: Impression: 1.Posterior right upper lobe pneumonia. 2.No evidence of pulmonary embolism. 3.Other incidental nonemergent findings as detailed above. No definitive metastatic disease identified. Electronically Signed: Uri De Leon MD  10/10/2023 11:22 AM CDT  Workstation ID: [id number]    XR Chest 1 View    Result Date: 10/10/2023  XR CHEST 1 VW Date of Exam: 10/10/2023 10:46 AM EDT Indication: Weak/Dizzy/AMS triage protocol Comparison: 12/17/2022. Findings: There is a new large opacity of the right upper lobe extending into the right hilum. The left lung is clear.  The heart size is normal. There are no definite effusions.     Impression: Impression: Large opacity on the right likely relates to pneumonia although underlying mass is not excluded and follow-up exam to clearing recommended. Electronically Signed: Mervat Hooker MD  10/10/2023 11:06 AM EDT  Workstation ID: [id number]         Current medications:  Scheduled Meds:aspirin, 81 mg, Oral, Daily  atorvastatin, 10 mg, Oral, Nightly  cefTRIAXone, 2,000 mg, Intravenous, Q24H  enoxaparin, 40 mg, Subcutaneous, Daily  insulin lispro, 2-7 Units, Subcutaneous, 4x Daily AC & at Bedtime  Pancrelipase (Lip-Prot-Amyl), 36,000 units of lipase, Oral, TID With Meals  relugolix, 120 mg, Oral, Daily  senna-docusate sodium, 2 tablet, Oral, BID  sodium chloride, 10 mL, Intravenous, Q12H      Continuous Infusions:lactated ringers, 100 mL/hr, Last Rate: 100 mL/hr (10/12/23 0154)      PRN Meds:.  acetaminophen    senna-docusate sodium **AND** polyethylene glycol **AND** bisacodyl **AND** bisacodyl    Calcium Replacement - Follow Nurse / BPA Driven Protocol    dextrose    dextrose    glucagon (human recombinant)    HYDROcodone-acetaminophen    HYDROmorphone **AND** naloxone    Magnesium Standard Dose Replacement - Follow Nurse / BPA Driven Protocol    ondansetron **OR** ondansetron    Phosphorus Replacement - Follow Nurse / BPA Driven Protocol    Potassium Replacement - Follow Nurse / BPA Driven Protocol    sodium chloride    sodium chloride    sodium chloride    Assessment & Plan   Assessment & Plan     Active Hospital Problems    Diagnosis  POA    **Sepsis [A41.9]  Yes    PNA (pneumonia) [J18.9]  Yes    Chronic nausea [R11.0]  Yes    Prostate cancer [C61]  Yes    Diabetes mellitus [E11.9]  Yes      Resolved Hospital Problems   No resolved problems to display.        Brief Hospital Course to date:  Julian Rodriguez is a 58 y.o. male here with sepsis    Sepsis  Strep pneumonia bacteremia  PNA  -Home on IV rocephin when medically  ready, possibly today    +Rhinovirus  -supportive care    Leukocytosis  -resolved    Anemia  -monitor    Hyponatremia  -mild, PCP follow up    Low K  -replace prn    Metastatic prostate cancer  On GnRH    DM  -SSI        Expected Discharge Location and Transportation: Home on IV abx  Expected Discharge   Expected Discharge Date: 10/12/2023; Expected Discharge Time:      DVT prophylaxis:  Medical DVT prophylaxis orders are present.     AM-PAC 6 Clicks Score (PT): 23 (10/12/23 0200)    CODE STATUS:   Code Status and Medical Interventions:   Ordered at: 10/10/23 1413     Level Of Support Discussed With:    Patient     Code Status (Patient has no pulse and is not breathing):    CPR (Attempt to Resuscitate)     Medical Interventions (Patient has pulse or is breathing):    Full Support       Wilmer Melara MD  10/12/23

## 2023-10-12 NOTE — CASE MANAGEMENT/SOCIAL WORK
Case Management Discharge Note      Final Note: Patient is discharging to home with in office IV antibiotic infusions at [facility]. Confirmed with Shama at [facility]. First appointment is Friday, 10/13 at 1200 noon. Spoke to patient and wife at bedside. Both aware & agreeable with plan. Wife to transport. No other needs noted.         Selected Continued Care - Admitted Since 10/10/2023       Destination    No services have been selected for the patient.                Durable Medical Equipment    No services have been selected for the patient.                Dialysis/Infusion       Service Provider Selected Services Address Phone Fax Patient Preferred    [facility] [address] [place] [address] [address], [facility] [address] [phone] --              Home Medical Care    No services have been selected for the patient.                Therapy    No services have been selected for the patient.                Community Resources    No services have been selected for the patient.                Community & DME    No services have been selected for the patient.                    Transportation Services  Private: Car    Final Discharge Disposition Code: 01 - home or self-care

## 2023-10-12 NOTE — DISCHARGE INSTR - APPOINTMENTS
Follow up with Dr. Dawson   Monday October 23, 2023   9:45 AM     [facility] [place] [address] [phone]

## 2023-10-12 NOTE — PLAN OF CARE
Goal Outcome Evaluation:  Plan of Care Reviewed With: patient        Progress: improving  Outcome Evaluation: A/O x4, remains on room air. VSS, Afebrile, NSR, complained of back pain treated with PRN PO and IV pain medication. No complaints of nausea. Will continue with plan of care.

## 2023-10-13 LAB
BACTERIA SPEC AEROBE CULT: ABNORMAL
GRAM STN SPEC: ABNORMAL
GRAM STN SPEC: ABNORMAL
ISOLATED FROM: ABNORMAL

## 2023-10-13 NOTE — DISCHARGE SUMMARY
[facility] Medicine Services  DISCHARGE SUMMARY    Patient Name: Julian Rodriguez  : 1965  MRN: [id number]    Date of Admission: 10/10/2023 10:02 AM  Date of Discharge:  10/13/2023  Primary Care Physician: Aaliyah Dawson MD    Consults       Date and Time Order Name Status Description    10/11/2023  7:09 AM Inpatient Infectious Diseases Consult Completed             Hospital Course     Presenting Problem: Bacteremia    Active Hospital Problems    Diagnosis  POA    **Sepsis [A41.9]  Yes    Moderate malnutrition [E44.0]  Yes    Streptococcal pneumonia [J15.4]  Yes    PNA (pneumonia) [J18.9]  Yes    Chronic nausea [R11.0]  Yes    Prostate cancer [C61]  Yes    Diabetes mellitus [E11.9]  Yes      Resolved Hospital Problems   No resolved problems to display.          Hospital Course:  Julian Rodriguez is a 58 y.o. male that presented with sepsis and was found to have pneumonia and pneumococcus bacteremia. He was treated with IV antibiotics and rapidly improved. He was evaluated by infectious disease and will continue IV rocephin in infectious disease clinic and will be followed by Dr. Sanchez. He was incidentally found to have rhinovirus as well.       Discharge Follow Up Recommendations for outpatient labs/diagnostics:  As written    Day of Discharge     HPI:   +F/C/sweats. Cough/congestion/pleurisy. Achy/tired, slept poorly. Not sure if ready to go home yet.              Objective   Objective      Vital Signs:   Temp:  [98.1 øF (36.7 øC)-99.3 øF (37.4 øC)] 98.1 øF (36.7 øC)  Heart Rate:  [63-80] 64  Resp:  [16] 16  BP: (125-136)/(73-82) 136/79     Physical Exam:  NAD, alert and oriented  OP clear, MMM  Neck supple  No LAD  RRR  Decreased at bases  +BS, soft  COPPOLA  Normal affect  No rashes  Pertinent  and/or Most Recent Results     LAB RESULTS:      Lab 10/12/23  0406 10/11/23  0425 10/10/23  1056   WBC 4.23 7.75 15.54*   HEMOGLOBIN 8.7* 9.1* 11.4*   HEMATOCRIT 25.0* 26.0* 32.3*    PLATELETS 149 140 160   NEUTROS ABS  --   --  14.02*   IMMATURE GRANS (ABS)  --   --  0.34*   LYMPHS ABS  --   --  0.71   MONOS ABS  --   --  0.45   EOS ABS  --   --  0.00   MCV 94.0 93.2 94.7   PROCALCITONIN  --   --  6.45*   LACTATE  --   --  2.1*         Lab 10/12/23  0406 10/11/23  0425 10/10/23  1056   SODIUM 132* 133* 132*   POTASSIUM 3.3* 3.7 4.0   CHLORIDE 102 101 96*   CO2 25.0 25.0 25.0   ANION GAP 5.0 7.0 11.0   BUN 9 11 15   CREATININE 0.84 0.86 1.04   EGFR 99.8 99.1 82.2   GLUCOSE 152* 135* 201*   CALCIUM 8.2* 8.5* 9.2   MAGNESIUM  --   --  1.8   HEMOGLOBIN A1C  --   --  6.60*         Lab 10/12/23  0406 10/10/23  1056   TOTAL PROTEIN 6.8 9.0*   ALBUMIN 2.6* 3.7   GLOBULIN 4.2 5.3   ALT (SGPT) 71* 27   AST (SGOT) 66* 32   BILIRUBIN 0.7 1.7*   ALK PHOS 104 82         Lab 10/10/23  1056   HSTROP T 12                 Brief Urine Lab Results  (Last result in the past 365 days)        Color   Clarity   Blood   Leuk Est   Nitrite   Protein   CREAT   Urine HCG        10/10/23 1157 Yellow   Clear   Moderate (2+)   Negative   Negative   100 mg/dL (2+)                 Microbiology Results (last 10 days)       Procedure Component Value - Date/Time    MRSA Screen, PCR (Inpatient) - Swab, Nares [[id number]]  (Normal) Collected: 10/10/23 1628    Lab Status: Final result Specimen: Swab from Nares Updated: 10/10/23 1759     MRSA PCR Negative    Narrative:      The negative predictive value of this diagnostic test is high and should only be used to consider de-escalating anti-MRSA therapy. A positive result may indicate colonization with MRSA and must be correlated clinically.  MRSA Negative    COVID PRE-OP / PRE-PROCEDURE SCREENING ORDER (NO ISOLATION) - Swab, Nasopharynx [[id number]]  (Abnormal) Collected: 10/10/23 1242    Lab Status: Final result Specimen: Swab from Nasopharynx Updated: 10/10/23 4616    Narrative:      The following orders were created for panel order COVID PRE-OP / PRE-PROCEDURE SCREENING ORDER (NO  ISOLATION) - Swab, Nasopharynx.  Procedure                               Abnormality         Status                     ---------                               -----------         ------                     Respiratory Panel PCR w/...[[id number]]  Abnormal            Final result                 Please view results for these tests on the individual orders.    Respiratory Panel PCR w/COVID-19(SARS-CoV-2) [facility] In-House, NP Swab in UTM/VTM, 3-4 HR TAT - Swab, Nasopharynx [[id number]]  (Abnormal) Collected: 10/10/23 1242    Lab Status: Final result Specimen: Swab from Nasopharynx Updated: 10/10/23 2839     ADENOVIRUS, PCR Not Detected     Coronavirus 229E Not Detected     Coronavirus HKU1 Not Detected     Coronavirus NL63 Not Detected     Coronavirus OC43 Not Detected     COVID19 Not Detected     Human Metapneumovirus Not Detected     Human Rhinovirus/Enterovirus Detected     Influenza A PCR Not Detected     Influenza B PCR Not Detected     Parainfluenza Virus 1 Not Detected     Parainfluenza Virus 2 Not Detected     Parainfluenza Virus 3 Not Detected     Parainfluenza Virus 4 Not Detected     RSV, PCR Not Detected     Bordetella pertussis pcr Not Detected     Bordetella parapertussis PCR Not Detected     Chlamydophila pneumoniae PCR Not Detected     Mycoplasma pneumo by PCR Not Detected    Narrative:      In the setting of a positive respiratory panel with a viral infection PLUS a negative procalcitonin without other underlying concern for bacterial infection, consider observing off antibiotics or discontinuation of antibiotics and continue supportive care. If the respiratory panel is positive for atypical bacterial infection (Bordetella pertussis, Chlamydophila pneumoniae, or Mycoplasma pneumoniae), consider antibiotic de-escalation to target atypical bacterial infection.    Blood Culture - Blood, Arm, Right [[id number]]  (Abnormal)  (Susceptibility) Collected: 10/10/23 1157    Lab Status: Final  result Specimen: Blood from Arm, Right Updated: 10/13/23 0613     Blood Culture Streptococcus pneumoniae     Isolated from Aerobic and Anaerobic Bottles     Gram Stain Aerobic Bottle Gram positive cocci in pairs and chains      Anaerobic Bottle Gram positive cocci in pairs and chains    Susceptibility        Streptococcus pneumoniae      LATHA      Ceftriaxone (Meningitis) Susceptible      Ceftriaxone (Non-meningitis) Susceptible      Levofloxacin Susceptible      Penicillin (Meningitis) Resistant      Penicillin (Non-Meningitis) Susceptible      Vancomycin Susceptible                           Blood Culture - Blood, Arm, Left [[id number]]  (Normal) Collected: 10/10/23 1157    Lab Status: Preliminary result Specimen: Blood from Arm, Left Updated: 10/12/23 1400     Blood Culture No growth at 2 days    Narrative:      Aerobic bottle only      Blood Culture ID, PCR - Blood, Arm, Right [[id number]]  (Abnormal) Collected: 10/10/23 1157    Lab Status: Final result Specimen: Blood from Arm, Right Updated: 10/11/23 0337     BCID, PCR Streptococcus pneumoniae. Identification by BCID2 PCR.     BOTTLE TYPE Aerobic Bottle            XR Chest 1 View    Result Date: 10/12/2023  XR CHEST 1 VW Date of Exam: 10/12/2023 3:15 AM EDT Indication: DYSPNEA, ON EXERTION dyspnea Comparison: CT chest 10/10/2023 Findings: Persistent right upper lobe consolidation consistent with pneumonia unchanged. Heart size top normal, stable. Left lung clear. Negative for pneumothorax or pleural effusion. Osseous structures intact.     Impression: Unchanged right upper lobe pneumonia. Electronically Signed: Saturnino Reynoso MD  10/12/2023 7:21 AM EDT  Workstation ID: [id number]    CT Angiogram Chest    Result Date: 10/10/2023  CT ANGIOGRAM CHEST, CT ABDOMEN PELVIS W CONTRAST Date of Exam: 10/10/2023 10:49 AM CDT Indication: pe. lung mass. hx of prostate cancer.. Comparison: CT abdomen pelvis 9/29/2021 Technique: CTA of the chest and CT abdomen and pelvis were  performed after the uneventful intravenous administration of 85 cc Isovue-370. Reconstructed coronal and sagittal images were also obtained. In addition, a 3-D volume rendered image was created for interpretation. Automated exposure control and iterative reconstruction methods were used. Findings: CT PULMONARY ANGIOGRAM PULMONARY VASCULATURE: Pulmonary arteries are widely patent without evidence of embolus.Main pulmonary artery is normal in size. No evidence of right heart strain. MEDIASTINUM:Unremarkable. Aortic and heart size are normal. No aortic dissection identified. No mass nor pericardial effusion. CORONARY ARTERIES: Moderate calcified atherosclerotic disease. LUNGS: There is dense consolidation of the posterior right upper lobe consistent with pneumonia. Mild bronchial wall thickening leading into this region. Lungs are otherwise clear. No nodule identified. PLEURAL SPACE: No effusion, mass, nor pneumothorax. LYMPH NODES: There are no pathologically enlarged lymph nodes. CT ABDOMEN AND PELVIS LIVER:  Unremarkable parenchyma without focal lesion. BILIARY/GALLBLADDER:  Unremarkable SPLEEN:  Unremarkable PANCREAS:  Unremarkable ADRENAL:  Unremarkable KIDNEYS:  Unremarkable parenchyma with no solid mass identified. No obstruction.  No calculus identified. GASTROINTESTINAL/MESENTERY:  No evidence of obstruction nor inflammation.  Colonic diverticulosis. No evidence of acute diverticulitis. The appendix is normal. MESENTERIC VESSELS:  Patent. AORTA/IVC:  Normal caliber. RETROPERITONEUM/LYMPH NODES:  Unremarkable REPRODUCTIVE: Prostatectomy. There is a penile implant. BLADDER:  Unremarkable OSSEUS STRUCTURES: There are multiple spinal compression fractures with previous kyphoplasty of T6. There are likely old right-sided rib fractures. No definite suspicious bony lesion identified. If osseous metastatic disease is suspected, nuclear medicine bone scan might be useful.     Impression: 1.Posterior right upper lobe  pneumonia. 2.No evidence of pulmonary embolism. 3.Other incidental nonemergent findings as detailed above. No definitive metastatic disease identified. Electronically Signed: Uri De Leon MD  10/10/2023 11:22 AM CDT  Workstation ID: [id number]    CT Abdomen Pelvis With Contrast    Result Date: 10/10/2023  CT ANGIOGRAM CHEST, CT ABDOMEN PELVIS W CONTRAST Date of Exam: 10/10/2023 10:49 AM CDT Indication: pe. lung mass. hx of prostate cancer.. Comparison: CT abdomen pelvis 9/29/2021 Technique: CTA of the chest and CT abdomen and pelvis were performed after the uneventful intravenous administration of 85 cc Isovue-370. Reconstructed coronal and sagittal images were also obtained. In addition, a 3-D volume rendered image was created for interpretation. Automated exposure control and iterative reconstruction methods were used. Findings: CT PULMONARY ANGIOGRAM PULMONARY VASCULATURE: Pulmonary arteries are widely patent without evidence of embolus.Main pulmonary artery is normal in size. No evidence of right heart strain. MEDIASTINUM:Unremarkable. Aortic and heart size are normal. No aortic dissection identified. No mass nor pericardial effusion. CORONARY ARTERIES: Moderate calcified atherosclerotic disease. LUNGS: There is dense consolidation of the posterior right upper lobe consistent with pneumonia. Mild bronchial wall thickening leading into this region. Lungs are otherwise clear. No nodule identified. PLEURAL SPACE: No effusion, mass, nor pneumothorax. LYMPH NODES: There are no pathologically enlarged lymph nodes. CT ABDOMEN AND PELVIS LIVER:  Unremarkable parenchyma without focal lesion. BILIARY/GALLBLADDER:  Unremarkable SPLEEN:  Unremarkable PANCREAS:  Unremarkable ADRENAL:  Unremarkable KIDNEYS:  Unremarkable parenchyma with no solid mass identified. No obstruction.  No calculus identified. GASTROINTESTINAL/MESENTERY:  No evidence of obstruction nor inflammation.  Colonic diverticulosis. No evidence of acute  diverticulitis. The appendix is normal. MESENTERIC VESSELS:  Patent. AORTA/IVC:  Normal caliber. RETROPERITONEUM/LYMPH NODES:  Unremarkable REPRODUCTIVE: Prostatectomy. There is a penile implant. BLADDER:  Unremarkable OSSEUS STRUCTURES: There are multiple spinal compression fractures with previous kyphoplasty of T6. There are likely old right-sided rib fractures. No definite suspicious bony lesion identified. If osseous metastatic disease is suspected, nuclear medicine bone scan might be useful.     Impression: 1.Posterior right upper lobe pneumonia. 2.No evidence of pulmonary embolism. 3.Other incidental nonemergent findings as detailed above. No definitive metastatic disease identified. Electronically Signed: Uri De Leon MD  10/10/2023 11:22 AM CDT  Workstation ID: [id number]    XR Chest 1 View    Result Date: 10/10/2023  XR CHEST 1 VW Date of Exam: 10/10/2023 10:46 AM EDT Indication: Weak/Dizzy/AMS triage protocol Comparison: 12/17/2022. Findings: There is a new large opacity of the right upper lobe extending into the right hilum. The left lung is clear. The heart size is normal. There are no definite effusions.     Impression: Large opacity on the right likely relates to pneumonia although underlying mass is not excluded and follow-up exam to clearing recommended. Electronically Signed: Mervat Hooker MD  10/10/2023 11:06 AM EDT  Workstation ID: [id number]                 Plan for Follow-up of Pending Labs/Results: Reviewed  Pending Labs       Order Current Status    Blood Culture - Blood, Arm, Left Preliminary result          Discharge Details        Discharge Medications        New Medications        Instructions Start Date   cefTRIAXone  Commonly known as: ROCEPHIN   2,000 mg, Intravenous, Every 24 Hours             Continue These Medications        Instructions Start Date   aspirin 81 MG EC tablet   81 mg, Oral, Daily, OTC      atorvastatin 10 MG tablet  Commonly known as: LIPITOR   10 mg, Oral, Nightly       fish oil 1000 MG capsule capsule   1,000 mg, Oral, Daily With Breakfast, OTC      metFORMIN 1000 MG tablet  Commonly known as: GLUCOPHAGE   1,000 mg, Oral, Every Night at Bedtime      Pancrelipase (Lip-Prot-Amyl) 92921-128566 units capsule delayed-release particles capsule  Commonly known as: CREON   36,000 units of lipase, Oral, 3 Times Daily With Meals      relugolix 120 MG tablet tablet  Commonly known as: ORGOVYX   120 mg, Oral, Daily               No Known Allergies      Discharge Disposition:  Home or Self Care    Diet:  Hospital:No active diet order           Activity:      Restrictions or Other Recommendations:         CODE STATUS:    Code Status and Medical Interventions:   Ordered at: 10/10/23 1413     Level Of Support Discussed With:    Patient     Code Status (Patient has no pulse and is not breathing):    CPR (Attempt to Resuscitate)     Medical Interventions (Patient has pulse or is breathing):    Full Support       No future appointments.    Additional Instructions for the Follow-ups that You Need to Schedule       Discharge Follow-up with PCP   As directed       Currently Documented PCP:    Aaliyah Dawson MD    PCP Phone Number:    [phone]     Follow Up Details: 2 weeks        Discharge Follow-up with Specified Provider: [facility] tomorrow for IV antibiotics and follow up with Dr. Sanchez   As directed      To: [facility] tomorrow for IV antibiotics and follow up with Dr. Daniel Melara MD  10/13/23      Time Spent on Discharge:  I spent  32  minutes on this discharge activity which included: face-to-face encounter with the patient, reviewing the data in the system, coordination of the care with the nursing staff as well as consultants, documentation, and entering orders.

## 2023-10-15 LAB — BACTERIA SPEC AEROBE CULT: NORMAL

## 2023-10-16 ENCOUNTER — LAB (OUTPATIENT)
Dept: LAB | Facility: HOSPITAL | Age: 58
End: 2023-10-16
Payer: MEDICARE

## 2023-10-16 ENCOUNTER — TRANSCRIBE ORDERS (OUTPATIENT)
Dept: LAB | Facility: HOSPITAL | Age: 58
End: 2023-10-16
Payer: MEDICARE

## 2023-10-16 DIAGNOSIS — J20.6 ACUTE BRONCHITIS DUE TO RHINOVIRUS: ICD-10-CM

## 2023-10-16 DIAGNOSIS — D72.823 NEUTROPHILIC LEUKEMOID REACTION: ICD-10-CM

## 2023-10-16 DIAGNOSIS — A40.3 SEPTICEMIA DUE TO STREPTOCOCCUS PNEUMONIAE: ICD-10-CM

## 2023-10-16 DIAGNOSIS — J13 PNEUMONIA DUE TO STREPTOCOCCUS PNEUMONIAE, UNSPECIFIED LATERALITY, UNSPECIFIED PART OF LUNG: Primary | ICD-10-CM

## 2023-10-16 DIAGNOSIS — E11.69 TYPE 2 DIABETES MELLITUS WITH OTHER SPECIFIED COMPLICATION, UNSPECIFIED WHETHER LONG TERM INSULIN USE: ICD-10-CM

## 2023-10-16 DIAGNOSIS — J13 PNEUMONIA DUE TO STREPTOCOCCUS PNEUMONIAE, UNSPECIFIED LATERALITY, UNSPECIFIED PART OF LUNG: ICD-10-CM

## 2023-10-16 LAB
ALBUMIN SERPL-MCNC: 3.7 G/DL (ref 3.5–5.2)
ALBUMIN/GLOB SERPL: 0.7 G/DL
ALP SERPL-CCNC: 114 U/L (ref 39–117)
ALT SERPL W P-5'-P-CCNC: 42 U/L (ref 1–41)
ANION GAP SERPL CALCULATED.3IONS-SCNC: 11 MMOL/L (ref 5–15)
AST SERPL-CCNC: 26 U/L (ref 1–40)
BASOPHILS # BLD AUTO: 0.04 10*3/MM3 (ref 0–0.2)
BASOPHILS NFR BLD AUTO: 0.6 % (ref 0–1.5)
BILIRUB SERPL-MCNC: 0.6 MG/DL (ref 0–1.2)
BUN SERPL-MCNC: 15 MG/DL (ref 8–23)
BUN/CREAT SERPL: 15.8 (ref 7–25)
CALCIUM SPEC-SCNC: 9.3 MG/DL (ref 8.6–10.5)
CHLORIDE SERPL-SCNC: 100 MMOL/L (ref 98–107)
CO2 SERPL-SCNC: 25 MMOL/L (ref 22–29)
CREAT SERPL-MCNC: 0.95 MG/DL (ref 0.76–1.27)
CRP SERPL-MCNC: 1.02 MG/DL (ref 0–0.5)
DEPRECATED RDW RBC AUTO: 43.8 FL (ref 37–54)
EGFRCR SERPLBLD CKD-EPI 2021: 91.6 ML/MIN/1.73
EOSINOPHIL # BLD AUTO: 0.15 10*3/MM3 (ref 0–0.4)
EOSINOPHIL NFR BLD AUTO: 2.2 % (ref 0.3–6.2)
ERYTHROCYTE [DISTWIDTH] IN BLOOD BY AUTOMATED COUNT: 12.8 % (ref 12.3–15.4)
ERYTHROCYTE [SEDIMENTATION RATE] IN BLOOD: 122 MM/HR (ref 0–20)
GLOBULIN UR ELPH-MCNC: 5.6 GM/DL
GLUCOSE SERPL-MCNC: 140 MG/DL (ref 65–99)
HCT VFR BLD AUTO: 30.1 % (ref 37.5–51)
HGB BLD-MCNC: 10.7 G/DL (ref 13–17.7)
IMM GRANULOCYTES # BLD AUTO: 0.15 10*3/MM3 (ref 0–0.05)
IMM GRANULOCYTES NFR BLD AUTO: 2.2 % (ref 0–0.5)
LYMPHOCYTES # BLD AUTO: 1.98 10*3/MM3 (ref 0.7–3.1)
LYMPHOCYTES NFR BLD AUTO: 29 % (ref 19.6–45.3)
MCH RBC QN AUTO: 32.8 PG (ref 26.6–33)
MCHC RBC AUTO-ENTMCNC: 35.5 G/DL (ref 31.5–35.7)
MCV RBC AUTO: 92.3 FL (ref 79–97)
MONOCYTES # BLD AUTO: 0.48 10*3/MM3 (ref 0.1–0.9)
MONOCYTES NFR BLD AUTO: 7 % (ref 5–12)
NEUTROPHILS NFR BLD AUTO: 4.02 10*3/MM3 (ref 1.7–7)
NEUTROPHILS NFR BLD AUTO: 59 % (ref 42.7–76)
NRBC BLD AUTO-RTO: 0 /100 WBC (ref 0–0.2)
PLATELET # BLD AUTO: 358 10*3/MM3 (ref 140–450)
PMV BLD AUTO: 8.4 FL (ref 6–12)
POTASSIUM SERPL-SCNC: 4 MMOL/L (ref 3.5–5.2)
PROT SERPL-MCNC: 9.3 G/DL (ref 6–8.5)
RBC # BLD AUTO: 3.26 10*6/MM3 (ref 4.14–5.8)
SODIUM SERPL-SCNC: 136 MMOL/L (ref 136–145)
WBC NRBC COR # BLD: 6.82 10*3/MM3 (ref 3.4–10.8)

## 2023-10-16 PROCEDURE — 36415 COLL VENOUS BLD VENIPUNCTURE: CPT

## 2023-10-16 PROCEDURE — 85025 COMPLETE CBC W/AUTO DIFF WBC: CPT

## 2023-10-16 PROCEDURE — 80053 COMPREHEN METABOLIC PANEL: CPT

## 2023-10-16 PROCEDURE — 85652 RBC SED RATE AUTOMATED: CPT

## 2023-10-16 PROCEDURE — 86140 C-REACTIVE PROTEIN: CPT

## 2023-10-18 ENCOUNTER — TRANSCRIBE ORDERS (OUTPATIENT)
Dept: GENERAL RADIOLOGY | Facility: HOSPITAL | Age: 58
End: 2023-10-18
Payer: MEDICARE

## 2023-10-18 ENCOUNTER — HOSPITAL ENCOUNTER (OUTPATIENT)
Dept: GENERAL RADIOLOGY | Facility: HOSPITAL | Age: 58
Discharge: HOME | End: 2023-10-18
Admitting: NURSE PRACTITIONER
Payer: MEDICARE

## 2023-10-18 DIAGNOSIS — J13 PNEUMONIA DUE TO STREPTOCOCCUS PNEUMONIAE, UNSPECIFIED LATERALITY, UNSPECIFIED PART OF LUNG: ICD-10-CM

## 2023-10-18 DIAGNOSIS — J13 PNEUMONIA DUE TO STREPTOCOCCUS PNEUMONIAE, UNSPECIFIED LATERALITY, UNSPECIFIED PART OF LUNG: Primary | ICD-10-CM

## 2023-10-18 PROCEDURE — 71046 X-RAY EXAM CHEST 2 VIEWS: CPT

## 2023-10-28 ENCOUNTER — HOSPITAL ENCOUNTER (OUTPATIENT)
Dept: RADIOLOGY | Facility: CLINIC | Age: 58
Discharge: HOME | End: 2023-10-28
Payer: MEDICARE

## 2023-10-28 ENCOUNTER — TELEPHONE (OUTPATIENT)
Dept: URGENT CARE | Facility: CLINIC | Age: 58
End: 2023-10-28

## 2023-10-31 ENCOUNTER — TRANSCRIBE ORDERS (OUTPATIENT)
Dept: LAB | Facility: HOSPITAL | Age: 58
End: 2023-10-31
Payer: MEDICARE

## 2023-10-31 ENCOUNTER — LAB (OUTPATIENT)
Dept: LAB | Facility: HOSPITAL | Age: 58
End: 2023-10-31
Payer: MEDICARE

## 2023-10-31 DIAGNOSIS — A40.3 SEPSIS DUE TO STREPTOCOCCUS PNEUMONIAE, UNSPECIFIED WHETHER ACUTE ORGAN DYSFUNCTION PRESENT: ICD-10-CM

## 2023-10-31 DIAGNOSIS — J13 PNEUMONIA DUE TO STREPTOCOCCUS PNEUMONIAE, UNSPECIFIED LATERALITY, UNSPECIFIED PART OF LUNG: Primary | ICD-10-CM

## 2023-10-31 PROCEDURE — 87205 SMEAR GRAM STAIN: CPT | Performed by: INTERNAL MEDICINE

## 2023-10-31 PROCEDURE — 87070 CULTURE OTHR SPECIMN AEROBIC: CPT | Performed by: INTERNAL MEDICINE

## 2023-11-03 LAB
BACTERIA SPEC RESP CULT: NORMAL
GRAM STN SPEC: NORMAL

## (undated) DEVICE — TUBING, SUCTION, 1/4" X 10', STRAIGHT: Brand: MEDLINE

## (undated) DEVICE — HYBRID CO2 TUBING/CAP SET FOR OLYMPUS® SCOPES & CO2 SOURCE: Brand: ERBE

## (undated) DEVICE — SOL IRR H2O BTL 1000ML STRL

## (undated) DEVICE — ADAPT CLN LUM OLYMP AIR/H20

## (undated) DEVICE — THE BITE BLOCK MAXI, LATEX FREE STRAP IS USED TO PROTECT THE ENDOSCOPE INSERTION TUBE FROM BEING BITTEN BY THE PATIENT.

## (undated) DEVICE — CONTN GRAD MEAS TRIANG 32OZ BLK

## (undated) DEVICE — ST LINER SAFECAP GRN RED CP STRL

## (undated) DEVICE — INTRO ACCSR BLNT TP

## (undated) DEVICE — SYR LUERLOK 50ML

## (undated) DEVICE — FIRST STEP BEDSIDE ADD WATER KIT - RESEALABLE STAND-UP POUCH, ENDOSCOPIC CLEANING PAD - 1 POUCH: Brand: FIRST STEP BEDSIDE ADD WATER KIT - RESEALABLE STAND-UP POUCH, ENDOSCOPIC CLEANIN